# Patient Record
Sex: FEMALE | Race: WHITE | NOT HISPANIC OR LATINO | Employment: OTHER | URBAN - METROPOLITAN AREA
[De-identification: names, ages, dates, MRNs, and addresses within clinical notes are randomized per-mention and may not be internally consistent; named-entity substitution may affect disease eponyms.]

---

## 2017-10-23 RX ORDER — ATORVASTATIN CALCIUM 20 MG/1
TABLET, FILM COATED ORAL
Qty: 90 TABLET | Refills: 2 | Status: SHIPPED | OUTPATIENT
Start: 2017-10-23 | End: 2017-11-14 | Stop reason: HOSPADM

## 2017-11-12 ENCOUNTER — APPOINTMENT (OUTPATIENT)
Dept: NUCLEAR MEDICINE | Facility: HOSPITAL | Age: 72
End: 2017-11-12

## 2017-11-12 ENCOUNTER — APPOINTMENT (OUTPATIENT)
Dept: CT IMAGING | Facility: HOSPITAL | Age: 72
End: 2017-11-12

## 2017-11-12 ENCOUNTER — APPOINTMENT (OUTPATIENT)
Dept: GENERAL RADIOLOGY | Facility: HOSPITAL | Age: 72
End: 2017-11-12

## 2017-11-12 ENCOUNTER — HOSPITAL ENCOUNTER (OUTPATIENT)
Facility: HOSPITAL | Age: 72
Setting detail: OBSERVATION
LOS: 1 days | Discharge: HOME | End: 2017-11-14
Attending: EMERGENCY MEDICINE | Admitting: HOSPITALIST

## 2017-11-12 DIAGNOSIS — R55 SYNCOPE, UNSPECIFIED SYNCOPE TYPE: Primary | ICD-10-CM

## 2017-11-12 DIAGNOSIS — R00.1 BRADYCARDIA: ICD-10-CM

## 2017-11-12 DIAGNOSIS — R79.89 ELEVATED D-DIMER: ICD-10-CM

## 2017-11-12 PROBLEM — R93.89 MEDIASTINAL SHIFT: Status: ACTIVE | Noted: 2017-11-12

## 2017-11-12 PROBLEM — N18.4 CKD (CHRONIC KIDNEY DISEASE) STAGE 4, GFR 15-29 ML/MIN (HCC): Status: ACTIVE | Noted: 2017-11-12

## 2017-11-12 PROBLEM — D72.829 LEUKOCYTOSIS: Status: ACTIVE | Noted: 2017-11-12

## 2017-11-12 PROBLEM — I10 HTN (HYPERTENSION): Status: ACTIVE | Noted: 2017-11-12

## 2017-11-12 PROBLEM — Z91.199 MEDICALLY NONCOMPLIANT: Status: ACTIVE | Noted: 2017-11-12

## 2017-11-12 LAB
ABO GROUP BLD: NORMAL
ALBUMIN SERPL-MCNC: 3.8 G/DL (ref 3.5–5.2)
ALBUMIN/GLOB SERPL: 1.3 G/DL
ALP SERPL-CCNC: 64 U/L (ref 39–117)
ALT SERPL W P-5'-P-CCNC: 10 U/L (ref 1–33)
ANION GAP SERPL CALCULATED.3IONS-SCNC: 12.2 MMOL/L
APTT PPP: 22.9 SECONDS (ref 22.7–35.4)
AST SERPL-CCNC: 11 U/L (ref 1–32)
BASOPHILS # BLD AUTO: 0.03 10*3/MM3 (ref 0–0.2)
BASOPHILS NFR BLD AUTO: 0.3 % (ref 0–1.5)
BILIRUB SERPL-MCNC: 1.1 MG/DL (ref 0.1–1.2)
BLD GP AB SCN SERPL QL: NEGATIVE
BUN BLD-MCNC: 20 MG/DL (ref 8–23)
BUN/CREAT SERPL: 11.6 (ref 7–25)
CALCIUM SPEC-SCNC: 9.7 MG/DL (ref 8.6–10.5)
CHLORIDE SERPL-SCNC: 104 MMOL/L (ref 98–107)
CK SERPL-CCNC: 27 U/L (ref 20–180)
CO2 SERPL-SCNC: 24.8 MMOL/L (ref 22–29)
CREAT BLD-MCNC: 1.73 MG/DL (ref 0.57–1)
D DIMER PPP FEU-MCNC: 0.61 MCGFEU/ML (ref 0–0.49)
DEPRECATED RDW RBC AUTO: 46.8 FL (ref 37–54)
EOSINOPHIL # BLD AUTO: 0.06 10*3/MM3 (ref 0–0.7)
EOSINOPHIL NFR BLD AUTO: 0.5 % (ref 0.3–6.2)
ERYTHROCYTE [DISTWIDTH] IN BLOOD BY AUTOMATED COUNT: 14.4 % (ref 11.7–13)
GFR SERPL CREATININE-BSD FRML MDRD: 29 ML/MIN/1.73
GLOBULIN UR ELPH-MCNC: 3 GM/DL
GLUCOSE BLD-MCNC: 181 MG/DL (ref 65–99)
HCT VFR BLD AUTO: 44.4 % (ref 35.6–45.5)
HGB BLD-MCNC: 13.8 G/DL (ref 11.9–15.5)
IMM GRANULOCYTES # BLD: 0.03 10*3/MM3 (ref 0–0.03)
IMM GRANULOCYTES NFR BLD: 0.3 % (ref 0–0.5)
INR PPP: 1.05 (ref 0.9–1.1)
LYMPHOCYTES # BLD AUTO: 2.55 10*3/MM3 (ref 0.9–4.8)
LYMPHOCYTES NFR BLD AUTO: 22.4 % (ref 19.6–45.3)
MAGNESIUM SERPL-MCNC: 2.1 MG/DL (ref 1.6–2.4)
MCH RBC QN AUTO: 27.6 PG (ref 26.9–32)
MCHC RBC AUTO-ENTMCNC: 31.1 G/DL (ref 32.4–36.3)
MCV RBC AUTO: 88.8 FL (ref 80.5–98.2)
MONOCYTES # BLD AUTO: 0.74 10*3/MM3 (ref 0.2–1.2)
MONOCYTES NFR BLD AUTO: 6.5 % (ref 5–12)
NEUTROPHILS # BLD AUTO: 7.98 10*3/MM3 (ref 1.9–8.1)
NEUTROPHILS NFR BLD AUTO: 70 % (ref 42.7–76)
PLATELET # BLD AUTO: 219 10*3/MM3 (ref 140–500)
PMV BLD AUTO: 10 FL (ref 6–12)
POTASSIUM BLD-SCNC: 3.9 MMOL/L (ref 3.5–5.2)
PROT SERPL-MCNC: 6.8 G/DL (ref 6–8.5)
PROTHROMBIN TIME: 13.3 SECONDS (ref 11.7–14.2)
RBC # BLD AUTO: 5 10*6/MM3 (ref 3.9–5.2)
RH BLD: POSITIVE
SODIUM BLD-SCNC: 141 MMOL/L (ref 136–145)
TROPONIN T SERPL-MCNC: <0.01 NG/ML (ref 0–0.03)
WBC NRBC COR # BLD: 11.39 10*3/MM3 (ref 4.5–10.7)

## 2017-11-12 PROCEDURE — 96374 THER/PROPH/DIAG INJ IV PUSH: CPT

## 2017-11-12 PROCEDURE — 71250 CT THORAX DX C-: CPT

## 2017-11-12 PROCEDURE — 80053 COMPREHEN METABOLIC PANEL: CPT | Performed by: EMERGENCY MEDICINE

## 2017-11-12 PROCEDURE — 93005 ELECTROCARDIOGRAM TRACING: CPT | Performed by: INTERNAL MEDICINE

## 2017-11-12 PROCEDURE — 85025 COMPLETE CBC W/AUTO DIFF WBC: CPT | Performed by: EMERGENCY MEDICINE

## 2017-11-12 PROCEDURE — 78582 LUNG VENTILAT&PERFUS IMAGING: CPT

## 2017-11-12 PROCEDURE — 86850 RBC ANTIBODY SCREEN: CPT | Performed by: EMERGENCY MEDICINE

## 2017-11-12 PROCEDURE — A9540 TC99M MAA: HCPCS | Performed by: HOSPITALIST

## 2017-11-12 PROCEDURE — 85610 PROTHROMBIN TIME: CPT | Performed by: EMERGENCY MEDICINE

## 2017-11-12 PROCEDURE — 84484 ASSAY OF TROPONIN QUANT: CPT | Performed by: EMERGENCY MEDICINE

## 2017-11-12 PROCEDURE — 85730 THROMBOPLASTIN TIME PARTIAL: CPT | Performed by: EMERGENCY MEDICINE

## 2017-11-12 PROCEDURE — 82550 ASSAY OF CK (CPK): CPT | Performed by: EMERGENCY MEDICINE

## 2017-11-12 PROCEDURE — 93010 ELECTROCARDIOGRAM REPORT: CPT | Performed by: INTERNAL MEDICINE

## 2017-11-12 PROCEDURE — 0 XENON XE 133: Performed by: HOSPITALIST

## 2017-11-12 PROCEDURE — A9558 XE133 XENON 10MCI: HCPCS | Performed by: HOSPITALIST

## 2017-11-12 PROCEDURE — 86900 BLOOD TYPING SEROLOGIC ABO: CPT | Performed by: EMERGENCY MEDICINE

## 2017-11-12 PROCEDURE — 25010000002 ONDANSETRON PER 1 MG

## 2017-11-12 PROCEDURE — 85379 FIBRIN DEGRADATION QUANT: CPT | Performed by: EMERGENCY MEDICINE

## 2017-11-12 PROCEDURE — 70450 CT HEAD/BRAIN W/O DYE: CPT

## 2017-11-12 PROCEDURE — 96361 HYDRATE IV INFUSION ADD-ON: CPT

## 2017-11-12 PROCEDURE — 93005 ELECTROCARDIOGRAM TRACING: CPT | Performed by: EMERGENCY MEDICINE

## 2017-11-12 PROCEDURE — 71010 HC CHEST PA OR AP: CPT

## 2017-11-12 PROCEDURE — 86901 BLOOD TYPING SEROLOGIC RH(D): CPT | Performed by: EMERGENCY MEDICINE

## 2017-11-12 PROCEDURE — 99284 EMERGENCY DEPT VISIT MOD MDM: CPT

## 2017-11-12 PROCEDURE — 25010000002 ONDANSETRON PER 1 MG: Performed by: EMERGENCY MEDICINE

## 2017-11-12 PROCEDURE — 74176 CT ABD & PELVIS W/O CONTRAST: CPT

## 2017-11-12 PROCEDURE — 0 TECHNETIUM ALBUMIN AGGREGATED: Performed by: HOSPITALIST

## 2017-11-12 PROCEDURE — 83735 ASSAY OF MAGNESIUM: CPT | Performed by: EMERGENCY MEDICINE

## 2017-11-12 RX ORDER — ONDANSETRON 2 MG/ML
INJECTION INTRAMUSCULAR; INTRAVENOUS
Status: COMPLETED
Start: 2017-11-12 | End: 2017-11-12

## 2017-11-12 RX ORDER — SODIUM CHLORIDE 0.9 % (FLUSH) 0.9 %
1-10 SYRINGE (ML) INJECTION AS NEEDED
Status: DISCONTINUED | OUTPATIENT
Start: 2017-11-12 | End: 2017-11-14 | Stop reason: HOSPADM

## 2017-11-12 RX ORDER — PHENAZOPYRIDINE HYDROCHLORIDE 100 MG/1
100 TABLET, FILM COATED ORAL 3 TIMES DAILY PRN
COMMUNITY

## 2017-11-12 RX ORDER — ATORVASTATIN CALCIUM 20 MG/1
20 TABLET, FILM COATED ORAL DAILY
Status: DISCONTINUED | OUTPATIENT
Start: 2017-11-13 | End: 2017-11-14 | Stop reason: HOSPADM

## 2017-11-12 RX ORDER — ASPIRIN 325 MG
325 TABLET ORAL ONCE
Status: COMPLETED | OUTPATIENT
Start: 2017-11-12 | End: 2017-11-12

## 2017-11-12 RX ORDER — ACETAMINOPHEN 325 MG/1
650 TABLET ORAL EVERY 4 HOURS PRN
Status: DISCONTINUED | OUTPATIENT
Start: 2017-11-12 | End: 2017-11-14 | Stop reason: HOSPADM

## 2017-11-12 RX ORDER — NITROFURANTOIN 25 MG/5ML
SUSPENSION ORAL 4 TIMES DAILY
COMMUNITY

## 2017-11-12 RX ORDER — ONDANSETRON 2 MG/ML
4 INJECTION INTRAMUSCULAR; INTRAVENOUS EVERY 4 HOURS PRN
Status: DISCONTINUED | OUTPATIENT
Start: 2017-11-12 | End: 2017-11-14 | Stop reason: HOSPADM

## 2017-11-12 RX ORDER — ONDANSETRON 2 MG/ML
4 INJECTION INTRAMUSCULAR; INTRAVENOUS ONCE
Status: COMPLETED | OUTPATIENT
Start: 2017-11-12 | End: 2017-11-12

## 2017-11-12 RX ORDER — SODIUM CHLORIDE 9 MG/ML
75 INJECTION, SOLUTION INTRAVENOUS CONTINUOUS
Status: DISCONTINUED | OUTPATIENT
Start: 2017-11-12 | End: 2017-11-14

## 2017-11-12 RX ORDER — ISOSORBIDE MONONITRATE 30 MG/1
30 TABLET, EXTENDED RELEASE ORAL
Status: DISCONTINUED | OUTPATIENT
Start: 2017-11-13 | End: 2017-11-14 | Stop reason: HOSPADM

## 2017-11-12 RX ORDER — AMLODIPINE BESYLATE 5 MG/1
5 TABLET ORAL
Status: DISCONTINUED | OUTPATIENT
Start: 2017-11-13 | End: 2017-11-14 | Stop reason: HOSPADM

## 2017-11-12 RX ORDER — NITROGLYCERIN 0.4 MG/1
0.4 TABLET SUBLINGUAL
Status: DISCONTINUED | OUTPATIENT
Start: 2017-11-12 | End: 2017-11-14 | Stop reason: HOSPADM

## 2017-11-12 RX ORDER — SODIUM CHLORIDE 0.9 % (FLUSH) 0.9 %
10 SYRINGE (ML) INJECTION AS NEEDED
Status: DISCONTINUED | OUTPATIENT
Start: 2017-11-12 | End: 2017-11-14 | Stop reason: HOSPADM

## 2017-11-12 RX ADMIN — ASPIRIN 325 MG: 325 TABLET ORAL at 22:38

## 2017-11-12 RX ADMIN — SODIUM CHLORIDE 125 ML/HR: 9 INJECTION, SOLUTION INTRAVENOUS at 16:21

## 2017-11-12 RX ADMIN — ONDANSETRON 4 MG: 2 INJECTION INTRAMUSCULAR; INTRAVENOUS at 16:09

## 2017-11-12 RX ADMIN — XENON XE-133 5 MILLICURIE: 10 GAS RESPIRATORY (INHALATION) at 19:43

## 2017-11-12 RX ADMIN — Medication 1 DOSE: at 19:44

## 2017-11-12 NOTE — ED PROVIDER NOTES
EMERGENCY DEPARTMENT ENCOUNTER    CHIEF COMPLAINT  Chief Complaint: Syncope  History given by: Pt and family  History limited by: Nothing  Room Number: JEF/JEF  PMD: Alex Bonner MD  Cardiologist: Dr. Emmanuel    HPI:  Pt is a 72 y.o. female who presents with family s/p syncopal episode this afternoon. They report the pt was playing bingo this afternoon when she became pale with sensations of being hot. The family reports that they applied a cold rag to her head and neck which momentarily improved her sx. Shortly after, the pt had a witnessed syncopal episode that lasted 30 seconds.  During that time the pt became unresponsive with her eyes open. They state that the pt reported she could her them, but did not respond. The pt states she has not eaten today because she forgot to eat. She also c/o nausea, chills, generalized weakness, and anxiety. She denies CP, HA, blurred vision, SOA, vomiting, abd pain, dark stool, and fever. She states she a hx of anemia 40 years ago    Duration:  30 seconds  Onset: gradual  Timing: intermittent  Quality: unresponsive with eyes open  Intensity/Severity: moderate  Progression: improved  Associated Symptoms: generalized weakness, decreased appetite, nausea, chills, anxiety  Aggravating Factors: none  Alleviating Factors: none  Previous Episodes: Pt reports a hx of anemia 40 years ago  Treatment before arrival: None stated    PAST MEDICAL HISTORY  Active Ambulatory Problems     Diagnosis Date Noted   • Left ureteral stone 06/24/2016   • Right ureteral stone 06/24/2016   • Hydronephrosis with urinary obstruction due to ureteral calculus 06/24/2016   • ARF (acute renal failure) 06/24/2016   • Diarrhea 06/24/2016   • Hyponatremia 06/24/2016   • Dehydration 06/24/2016   • Thrombocytopenia 06/25/2016   • Pyelonephritis 06/28/2016   • CKD (chronic kidney disease) 07/04/2016   • Chest pain 07/04/2016   • NSTEMI (non-ST elevated myocardial infarction) 07/06/2016   • Sepsis 07/06/2016   •  Neutrophilic leukocytosis 08/05/2016     Resolved Ambulatory Problems     Diagnosis Date Noted   • No Resolved Ambulatory Problems     Past Medical History:   Diagnosis Date   • Anemia    • Asthma    • Cellulitis    • Chest pain    • Edema    • Gastroenteritis    • Hypertension    • Kidney stones    • Pyelonephritis        PAST SURGICAL HISTORY  Past Surgical History:   Procedure Laterality Date   • CARDIAC CATHETERIZATION N/A 7/5/2016    Procedure: Left Heart Cath; no LV gram;  Surgeon: Jem Barrow MD;  Location: [facility] CATH INVASIVE LOCATION;  Service:    • CYSTOSCOPY W/ LITHOLAPAXY / EHL     • CYSTOSCOPY W/ URETERAL STENT PLACEMENT Bilateral 6/24/2016    Procedure: CYSTOSCOPY URETERAL CATHETER/STENT INSERTION;  Surgeon: Gaurav Mendoza Jr., MD;  Location: [facility] OR;  Service:    • DIAPHRAGMATIC HERNIA REPAIR     • HYSTERECTOMY         FAMILY HISTORY  Family History   Problem Relation Age of Onset   • Adopted: Yes       SOCIAL HISTORY  Social History     Social History   • Marital status: Single     Spouse name: N/A   • Number of children: N/A   • Years of education: N/A     Occupational History   • Not on file.     Social History Main Topics   • Smoking status: Former Smoker   • Smokeless tobacco: Never Used      Comment: quit at least 5 years ago   • Alcohol use No   • Drug use: No   • Sexual activity: Not on file     Other Topics Concern   • Not on file     Social History Narrative   • No narrative on file       ALLERGIES  Codeine; Motrin [ibuprofen]; Naproxen; Penicillins; and Sulfa antibiotics    REVIEW OF SYSTEMS  Review of Systems   Constitutional: Positive for appetite change (decreased) and chills. Negative for fatigue and fever.   HENT: Negative for sore throat.    Eyes: Negative.  Negative for visual disturbance.   Respiratory: Negative for cough and shortness of breath.    Cardiovascular: Negative for chest pain.   Gastrointestinal: Positive for nausea. Negative for abdominal pain, blood  in stool, diarrhea and vomiting.   Genitourinary: Negative for dysuria.   Musculoskeletal: Negative for neck pain.   Skin: Negative for rash.   Allergic/Immunologic: Negative.    Neurological: Positive for syncope (30 seconds) and weakness (generalized weakness). Negative for numbness and headaches.   Hematological: Negative.    Psychiatric/Behavioral: The patient is nervous/anxious.    All other systems reviewed and are negative.      PHYSICAL EXAM  ED Triage Vitals   Temp Heart Rate Resp BP SpO2   -- 11/12/17 1552 11/12/17 1552 -- 11/12/17 1552    47 18  95 %      Temp src Heart Rate Source Patient Position BP Location FiO2 (%)   -- 11/12/17 1552 -- -- --    Monitor          Physical Exam   Constitutional: She is oriented to person, place, and time and well-developed, well-nourished, and in no distress. No distress.   Pt is dry heaving on exam   HENT:   Head: Normocephalic and atraumatic.   Mouth/Throat: Oropharynx is clear and moist.   Eyes: EOM are normal. Pupils are equal, round, and reactive to light.   Neck: Normal range of motion. Neck supple. No JVD present. Carotid bruit is not present.   Cardiovascular: Regular rhythm, normal heart sounds and intact distal pulses.  Bradycardia present.  Exam reveals no gallop.    No murmur heard.  Pulses:       Dorsalis pedis pulses are 2+ on the right side, and 2+ on the left side.   Pulmonary/Chest: Effort normal and breath sounds normal. No respiratory distress. She has no wheezes. She has no rales.   Abdominal: Soft. Bowel sounds are normal. There is no tenderness. There is no rebound and no guarding.   Genitourinary: Rectal exam shows guaiac negative stool.   Genitourinary Comments: Brown stool in rectal vault   Musculoskeletal: Normal range of motion. She exhibits no edema.   Neurological: She is alert and oriented to person, place, and time. She has normal sensation and normal strength.   Pt displays generalized weakness without focal neuro deficits.    Skin: Skin  is warm and dry. No rash noted. There is pallor.   Skin is cool and clammy   Psychiatric: Mood and affect normal.   Nursing note and vitals reviewed.      LAB RESULTS  Lab Results (last 24 hours)     Procedure Component Value Units Date/Time    CBC & Differential [[id number]] Collected:  11/12/17 1615    Specimen:  Blood Updated:  11/12/17 1629    Narrative:       The following orders were created for panel order CBC & Differential.  Procedure                               Abnormality         Status                     ---------                               -----------         ------                     CBC Auto Differential[[id number]]        Abnormal            Final result                 Please view results for these tests on the individual orders.    Comprehensive Metabolic Panel [[id number]]  (Abnormal) Collected:  11/12/17 1615    Specimen:  Blood Updated:  11/12/17 1654     Glucose 181 (H) mg/dL      BUN 20 mg/dL      Creatinine 1.73 (H) mg/dL      Sodium 141 mmol/L      Potassium 3.9 mmol/L      Chloride 104 mmol/L      CO2 24.8 mmol/L      Calcium 9.7 mg/dL      Total Protein 6.8 g/dL      Albumin 3.80 g/dL      ALT (SGPT) 10 U/L      AST (SGOT) 11 U/L      Alkaline Phosphatase 64 U/L      Total Bilirubin 1.1 mg/dL      eGFR Non African Amer 29 (L) mL/min/1.73      Globulin 3.0 gm/dL      A/G Ratio 1.3 g/dL      BUN/Creatinine Ratio 11.6     Anion Gap 12.2 mmol/L     Narrative:       The MDRD GFR formula is only valid for adults with stable renal function between ages 18 and 70.    Protime-INR [[id number]]  (Normal) Collected:  11/12/17 1615    Specimen:  Blood Updated:  11/12/17 1642     Protime 13.3 Seconds      INR 1.05    aPTT [[id number]]  (Normal) Collected:  11/12/17 1615    Specimen:  Blood Updated:  11/12/17 1642     PTT 22.9 seconds     Troponin [[id number]]  (Normal) Collected:  11/12/17 1615    Specimen:  Blood Updated:  11/12/17 1653     Troponin T <0.010 ng/mL     Narrative:       Troponin T  Reference Ranges:  Less than 0.03 ng/mL:    Negative for AMI  0.03 to 0.09 ng/mL:      Indeterminant for AMI  Greater than 0.09 ng/mL: Positive for AMI    CK [[id number]]  (Normal) Collected:  11/12/17 1615    Specimen:  Blood Updated:  11/12/17 1653     Creatine Kinase 27 U/L     Magnesium [[id number]]  (Normal) Collected:  11/12/17 1615    Specimen:  Blood Updated:  11/12/17 1653     Magnesium 2.1 mg/dL     D-dimer, Quantitative [[id number]]  (Abnormal) Collected:  11/12/17 1615    Specimen:  Blood Updated:  11/12/17 1642     D-Dimer, Quantitative 0.61 (H) MCGFEU/mL     Narrative:       The Stago D-Dimer test used in conjunction with a clinical pretest probability (PTP) assessment model, has been approved by the FDA to rule out the presence of venous thromboembolism (VTE) in outpatients suspected of deep venous thrombosis (DVT) or pulmonary embolism (PE).     CBC Auto Differential [[id number]]  (Abnormal) Collected:  11/12/17 1615    Specimen:  Blood Updated:  11/12/17 1629     WBC 11.39 (H) 10*3/mm3      RBC 5.00 10*6/mm3      Hemoglobin 13.8 g/dL      Hematocrit 44.4 %      MCV 88.8 fL      MCH 27.6 pg      MCHC 31.1 (L) g/dL      RDW 14.4 (H) %      RDW-SD 46.8 fl      MPV 10.0 fL      Platelets 219 10*3/mm3      Neutrophil % 70.0 %      Lymphocyte % 22.4 %      Monocyte % 6.5 %      Eosinophil % 0.5 %      Basophil % 0.3 %      Immature Grans % 0.3 %      Neutrophils, Absolute 7.98 10*3/mm3      Lymphocytes, Absolute 2.55 10*3/mm3      Monocytes, Absolute 0.74 10*3/mm3      Eosinophils, Absolute 0.06 10*3/mm3      Basophils, Absolute 0.03 10*3/mm3      Immature Grans, Absolute 0.03 10*3/mm3           I ordered the above labs and reviewed the results    RADIOLOGY  CT Abdomen Pelvis Without Contrast   Final Result     1. Prominent elevation left hemidiaphragm including stomach and colon.  Visualized portion of the aerated right lung reveal minimal atelectasis  without acute disease.  2. Gallstones without other  biliary nor hepatic abnormality, normal  spleen and adjacent splenule.  3. Benign 2.5 cm left adrenal nodule, marked atrophy of the left kidney  with coarse calcifications including calcification within the renal  pelvis. Coarse calcifications within the upper pole moiety of a  duplicated right kidney which demonstrates mild cortical thinning but no  obstruction.  4. There is no obstruction, free air nor dilatation of bowel.  5. The uterus is surgically absent. There is no adnexal mass nor free  fluid  6. Degenerative changes lumbar spine with chronic compression of L1 L2  L3 and probably L4, relative canal stenosis L2-L3 with prominent  posterior spur formation. Canal stenosis L5-S1 with prominent osteophyte  extending into the bony canal from the left facet joint most pronounced  on axial image #89.     CT Head Without Contrast   Final Result   1. No evidence of acute intracranial process.       This report was finalized on 11/12/2017 5:11 PM by Dr. Onesimo Hudson MD.          XR Chest 1 View   Final Result     1. Limited imaging due to elevation left hemidiaphragm similar to  previous study obscuring the inferior two thirds of the left lung.  2. Mild mediastinal shift to the right.  3. Aerated portion of right lung appears grossly normal.  4. If further evaluation desired lateral view or perhaps CT would be  helpful.     NM Lung Ventilation Perfusion    (Results Pending)    Low probability for PE.     I ordered the above noted radiological studies. Interpreted by radiologist. Reviewed by me in PACS.       PROCEDURES  Critical Care  Performed by: RAMIRO OCHOA  Authorized by: RAMIRO OCHOA   Total critical care time: 40 minutes  Critical care time was exclusive of separately billable procedures and treating other patients.  Critical care was necessary to treat or prevent imminent or life-threatening deterioration of the following conditions: cardiac failure and CNS failure or compromise.  Critical care was time  spent personally by me on the following activities: development of treatment plan with patient or surrogate, discussions with primary provider, interpretation of cardiac output measurements, evaluation of patient's response to treatment, examination of patient, obtaining history from patient or surrogate, ordering and performing treatments and interventions, ordering and review of laboratory studies, re-evaluation of patient's condition, pulse oximetry, ordering and review of radiographic studies and review of old charts.          EKG           EKG time: 1555  Rhythm/Rate: sinus bradycardia, 48  P waves and NJ: normal  QRS, axis: normal   ST and T waves: nonspecific ST changes     Interpreted Contemporaneously by me, independently viewed  changed compared to prior 7/6/16       PROGRESS AND CONSULTS  ED Course     1601 - Zofran ordered for nausea.     1607 - Informd pt and daughter of the pt's EKG which shows sinus bradycardia, but looks improved compared to her prior EKG.     1613 - IVF ordered. Lab work ordered for further evaluation.     1615 - CT head, abd/pelvis, and CXR ordered for further evaluation.     1739 - VQ scan ordered.    1740 - Rechecked pt. Pt is resting comfortably and appears more responsive. Vitals are stable, and BP has improved. Pt is still bradycardic. Informed pt that she is not anemic, but has an elevated D-dimer and is in chronic renal failure. Informed pt of the imaging study which shows a diphermetic hernia on the L. D/w pt and family of the plan to perform a VQ scan. Informed pt and family of the plan for admission. All questions answered.       1744 - Consult placed with [facility].     1801 - Consulted with Dr. Gray ([facility]) who agrees to admit the pt telemetry.       MEDICAL DECISION MAKING  Results were reviewed/discussed with the patient and they were also made aware of online access. Pt also made aware that some labs, such as cultures, will not be resulted during ER visit and follow up  with PMD is necessary.     MDM  Number of Diagnoses or Management Options  Bradycardia:   Elevated d-dimer:   Syncope, unspecified syncope type:      Amount and/or Complexity of Data Reviewed  Clinical lab tests: ordered and reviewed (Creatinine - 1.73  Troponin - negative  D-dimer - 0.61  WBC - 11.39)  Tests in the radiology section of CPT®: ordered (CT head - negative acute  CXR - mild mediastinal shift to the right  CT abd/pelvis - Prominent elevation left hemidiaphragm including stomach and colon.)  Tests in the medicine section of CPT®: ordered and reviewed (See note.)  Decide to obtain previous medical records or to obtain history from someone other than the patient: yes  Review and summarize past medical records: yes (Pt was admitted June 2016 for a kidney stone and had a non-STEMI while she was an inpt. She was cathed and adivsed to be treated medically.)  Discuss the patient with other providers: yes (Dr. Gray ([facility]))    Critical Care  Total time providing critical care: 30-74 minutes         DIAGNOSIS  Final diagnoses:   Syncope, unspecified syncope type   Bradycardia   Elevated d-dimer       DISPOSITION  ADMISSION    Discussed treatment plan and reason for admission with pt/family and admitting physician.  Pt/family voiced understanding of the plan for admission for further testing/treatment as needed.         Latest Documented Vital Signs:  As of 7:31 PM  BP- 107/72 HR- (!) 49 Temp- 96.5 °F (35.8 °C) (Tympanic) O2 sat- 93%    --  Documentation assistance provided by joyce Ruiz for Dr. Doherty.  Information recorded by the scribe was done at my direction and has been verified and validated by me.       Collin Ruiz  11/12/17 1938       Juan Doherty MD  11/12/17 1959

## 2017-11-12 NOTE — ED NOTES
Patient son reports his mother was at [facility] when she began to get dizzy, nauseas and started to look pale. Patients son reports his mom has been lethargic and at one point she became unresponsive for about 30 seconds. Pt sons states that his mother did not fall     Marilyn Munguia RN  11/12/17 3072       Marilyn Munguia RN  11/12/17 4386

## 2017-11-13 ENCOUNTER — APPOINTMENT (OUTPATIENT)
Dept: CARDIOLOGY | Facility: HOSPITAL | Age: 72
End: 2017-11-13
Attending: HOSPITALIST

## 2017-11-13 ENCOUNTER — APPOINTMENT (OUTPATIENT)
Dept: MRI IMAGING | Facility: HOSPITAL | Age: 72
End: 2017-11-13
Attending: HOSPITALIST

## 2017-11-13 PROBLEM — R73.03 PREDIABETES: Status: ACTIVE | Noted: 2017-11-13

## 2017-11-13 PROBLEM — R82.90 ABNORMAL URINALYSIS: Status: ACTIVE | Noted: 2017-11-13

## 2017-11-13 LAB
ALBUMIN SERPL-MCNC: 3.5 G/DL (ref 3.5–5.2)
AMORPH URATE CRY URNS QL MICRO: ABNORMAL /HPF
ANION GAP SERPL CALCULATED.3IONS-SCNC: 15.5 MMOL/L
AORTIC DIMENSIONLESS INDEX: 0.7 (DI)
BACTERIA UR QL AUTO: ABNORMAL /HPF
BH CV ECHO MEAS - AO MAX PG: 9 MMHG
BH CV ECHO MEAS - AO MEAN PG (FULL): 3 MMHG
BH CV ECHO MEAS - AO MEAN PG: 5 MMHG
BH CV ECHO MEAS - AO ROOT AREA (BSA CORRECTED): 1.4
BH CV ECHO MEAS - AO ROOT AREA: 6.2 CM^2
BH CV ECHO MEAS - AO ROOT DIAM: 2.8 CM
BH CV ECHO MEAS - AO V2 MAX: 153 CM/SEC
BH CV ECHO MEAS - AO V2 MEAN: 100 CM/SEC
BH CV ECHO MEAS - AO V2 VTI: 33.7 CM
BH CV ECHO MEAS - ASC AORTA: 2.8 CM
BH CV ECHO MEAS - AVA(I,A): 2.2 CM^2
BH CV ECHO MEAS - AVA(I,D): 2.2 CM^2
BH CV ECHO MEAS - BSA(HAYCOCK): 2.1 M^2
BH CV ECHO MEAS - BSA: 2 M^2
BH CV ECHO MEAS - BZI_BMI: 32.3 KILOGRAMS/M^2
BH CV ECHO MEAS - BZI_METRIC_HEIGHT: 167.6 CM
BH CV ECHO MEAS - BZI_METRIC_WEIGHT: 90.7 KG
BH CV ECHO MEAS - CONTRAST EF (2CH): 59.6 ML/M^2
BH CV ECHO MEAS - CONTRAST EF 4CH: 50.5 ML/M^2
BH CV ECHO MEAS - EDV(MOD-SP2): 141 ML
BH CV ECHO MEAS - EDV(MOD-SP4): 93 ML
BH CV ECHO MEAS - EF(MOD-SP2): 59.6 %
BH CV ECHO MEAS - EF(MOD-SP4): 50.5 %
BH CV ECHO MEAS - ESV(MOD-SP2): 57 ML
BH CV ECHO MEAS - ESV(MOD-SP4): 46 ML
BH CV ECHO MEAS - LAT PEAK E' VEL: 7 CM/SEC
BH CV ECHO MEAS - LV DIASTOLIC VOL/BSA (35-75): 46.5 ML/M^2
BH CV ECHO MEAS - LV MAX PG: 4 MMHG
BH CV ECHO MEAS - LV MEAN PG: 2 MMHG
BH CV ECHO MEAS - LV SYSTOLIC VOL/BSA (12-30): 23 ML/M^2
BH CV ECHO MEAS - LV V1 MAX: 103 CM/SEC
BH CV ECHO MEAS - LV V1 MEAN: 63.6 CM/SEC
BH CV ECHO MEAS - LV V1 VTI: 24.1 CM
BH CV ECHO MEAS - LVLD AP2: 8.1 CM
BH CV ECHO MEAS - LVLD AP4: 7.4 CM
BH CV ECHO MEAS - LVLS AP2: 7.3 CM
BH CV ECHO MEAS - LVLS AP4: 5.7 CM
BH CV ECHO MEAS - LVOT AREA (M): 3.1 CM^2
BH CV ECHO MEAS - LVOT AREA: 3.1 CM^2
BH CV ECHO MEAS - LVOT DIAM: 2 CM
BH CV ECHO MEAS - MED PEAK E' VEL: 6 CM/SEC
BH CV ECHO MEAS - MV A DUR: 0.12 SEC
BH CV ECHO MEAS - MV A MAX VEL: 82.4 CM/SEC
BH CV ECHO MEAS - MV DEC SLOPE: 311 CM/SEC^2
BH CV ECHO MEAS - MV DEC TIME: 0.2 SEC
BH CV ECHO MEAS - MV E MAX VEL: 84.9 CM/SEC
BH CV ECHO MEAS - MV E/A: 1
BH CV ECHO MEAS - MV MEAN PG: 2 MMHG
BH CV ECHO MEAS - MV P1/2T MAX VEL: 102 CM/SEC
BH CV ECHO MEAS - MV P1/2T: 96.1 MSEC
BH CV ECHO MEAS - MV V2 MEAN: 60.8 CM/SEC
BH CV ECHO MEAS - MV V2 VTI: 34.6 CM
BH CV ECHO MEAS - MVA P1/2T LCG: 2.2 CM^2
BH CV ECHO MEAS - MVA(P1/2T): 2.3 CM^2
BH CV ECHO MEAS - MVA(VTI): 2.2 CM^2
BH CV ECHO MEAS - PA ACC SLOPE: 7.1 CM/SEC^2
BH CV ECHO MEAS - PA ACC TIME: 0.12 SEC
BH CV ECHO MEAS - PA MAX PG: 2.4 MMHG
BH CV ECHO MEAS - PA PR(ACCEL): 23.7 MMHG
BH CV ECHO MEAS - PA V2 MAX: 77.3 CM/SEC
BH CV ECHO MEAS - PULM A REVS DUR: 0.18 SEC
BH CV ECHO MEAS - PULM A REVS VEL: 35.9 CM/SEC
BH CV ECHO MEAS - PULM DIAS VEL: 39 CM/SEC
BH CV ECHO MEAS - PULM S/D: 1.2
BH CV ECHO MEAS - PULM SYS VEL: 46.3 CM/SEC
BH CV ECHO MEAS - QP/QS: 0.27
BH CV ECHO MEAS - RAP SYSTOLE: 3 MMHG
BH CV ECHO MEAS - RV MEAN PG: 1 MMHG
BH CV ECHO MEAS - RV V1 MEAN: 35.4 CM/SEC
BH CV ECHO MEAS - RV V1 VTI: 10.3 CM
BH CV ECHO MEAS - RVOT AREA: 2 CM^2
BH CV ECHO MEAS - RVOT DIAM: 1.6 CM
BH CV ECHO MEAS - SI(AO): 103.7 ML/M^2
BH CV ECHO MEAS - SI(LVOT): 37.9 ML/M^2
BH CV ECHO MEAS - SI(MOD-SP2): 42 ML/M^2
BH CV ECHO MEAS - SI(MOD-SP4): 23.5 ML/M^2
BH CV ECHO MEAS - SV(AO): 207.5 ML
BH CV ECHO MEAS - SV(LVOT): 75.7 ML
BH CV ECHO MEAS - SV(MOD-SP2): 84 ML
BH CV ECHO MEAS - SV(MOD-SP4): 47 ML
BH CV ECHO MEAS - SV(RVOT): 20.7 ML
BH CV ECHO MEAS - TAPSE (>1.6): 2.2 CM2
BH CV LOWER VASCULAR LEFT COMMON FEMORAL AUGMENT: NORMAL
BH CV LOWER VASCULAR LEFT COMMON FEMORAL COMPETENT: NORMAL
BH CV LOWER VASCULAR LEFT COMMON FEMORAL COMPRESS: NORMAL
BH CV LOWER VASCULAR LEFT COMMON FEMORAL PHASIC: NORMAL
BH CV LOWER VASCULAR LEFT COMMON FEMORAL SPONT: NORMAL
BH CV LOWER VASCULAR LEFT DISTAL FEMORAL COMPRESS: NORMAL
BH CV LOWER VASCULAR LEFT GASTRONEMIUS COMPRESS: NORMAL
BH CV LOWER VASCULAR LEFT GREATER SAPH AK COMPRESS: NORMAL
BH CV LOWER VASCULAR LEFT GREATER SAPH BK COMPRESS: NORMAL
BH CV LOWER VASCULAR LEFT MID FEMORAL AUGMENT: NORMAL
BH CV LOWER VASCULAR LEFT MID FEMORAL COMPETENT: NORMAL
BH CV LOWER VASCULAR LEFT MID FEMORAL COMPRESS: NORMAL
BH CV LOWER VASCULAR LEFT MID FEMORAL PHASIC: NORMAL
BH CV LOWER VASCULAR LEFT MID FEMORAL SPONT: NORMAL
BH CV LOWER VASCULAR LEFT PERONEAL COMPRESS: NORMAL
BH CV LOWER VASCULAR LEFT POPLITEAL AUGMENT: NORMAL
BH CV LOWER VASCULAR LEFT POPLITEAL COMPETENT: NORMAL
BH CV LOWER VASCULAR LEFT POPLITEAL COMPRESS: NORMAL
BH CV LOWER VASCULAR LEFT POPLITEAL PHASIC: NORMAL
BH CV LOWER VASCULAR LEFT POPLITEAL SPONT: NORMAL
BH CV LOWER VASCULAR LEFT POSTERIOR TIBIAL COMPRESS: NORMAL
BH CV LOWER VASCULAR LEFT PROXIMAL FEMORAL COMPRESS: NORMAL
BH CV LOWER VASCULAR LEFT SAPHENOFEMORAL JUNCTION AUGMENT: NORMAL
BH CV LOWER VASCULAR LEFT SAPHENOFEMORAL JUNCTION COMPETENT: NORMAL
BH CV LOWER VASCULAR LEFT SAPHENOFEMORAL JUNCTION COMPRESS: NORMAL
BH CV LOWER VASCULAR LEFT SAPHENOFEMORAL JUNCTION PHASIC: NORMAL
BH CV LOWER VASCULAR LEFT SAPHENOFEMORAL JUNCTION SPONT: NORMAL
BH CV LOWER VASCULAR RIGHT COMMON FEMORAL AUGMENT: NORMAL
BH CV LOWER VASCULAR RIGHT COMMON FEMORAL COMPETENT: NORMAL
BH CV LOWER VASCULAR RIGHT COMMON FEMORAL COMPRESS: NORMAL
BH CV LOWER VASCULAR RIGHT COMMON FEMORAL PHASIC: NORMAL
BH CV LOWER VASCULAR RIGHT COMMON FEMORAL SPONT: NORMAL
BH CV LOWER VASCULAR RIGHT DISTAL FEMORAL COMPRESS: NORMAL
BH CV LOWER VASCULAR RIGHT GASTRONEMIUS COMPRESS: NORMAL
BH CV LOWER VASCULAR RIGHT GREATER SAPH AK COMPRESS: NORMAL
BH CV LOWER VASCULAR RIGHT GREATER SAPH BK COMPRESS: NORMAL
BH CV LOWER VASCULAR RIGHT MID FEMORAL AUGMENT: NORMAL
BH CV LOWER VASCULAR RIGHT MID FEMORAL COMPETENT: NORMAL
BH CV LOWER VASCULAR RIGHT MID FEMORAL COMPRESS: NORMAL
BH CV LOWER VASCULAR RIGHT MID FEMORAL PHASIC: NORMAL
BH CV LOWER VASCULAR RIGHT MID FEMORAL SPONT: NORMAL
BH CV LOWER VASCULAR RIGHT PERONEAL COMPRESS: NORMAL
BH CV LOWER VASCULAR RIGHT POPLITEAL AUGMENT: NORMAL
BH CV LOWER VASCULAR RIGHT POPLITEAL COMPETENT: NORMAL
BH CV LOWER VASCULAR RIGHT POPLITEAL COMPRESS: NORMAL
BH CV LOWER VASCULAR RIGHT POPLITEAL PHASIC: NORMAL
BH CV LOWER VASCULAR RIGHT POPLITEAL SPONT: NORMAL
BH CV LOWER VASCULAR RIGHT POSTERIOR TIBIAL COMPRESS: NORMAL
BH CV LOWER VASCULAR RIGHT PROXIMAL FEMORAL COMPRESS: NORMAL
BH CV LOWER VASCULAR RIGHT SAPHENOFEMORAL JUNCTION AUGMENT: NORMAL
BH CV LOWER VASCULAR RIGHT SAPHENOFEMORAL JUNCTION COMPETENT: NORMAL
BH CV LOWER VASCULAR RIGHT SAPHENOFEMORAL JUNCTION COMPRESS: NORMAL
BH CV LOWER VASCULAR RIGHT SAPHENOFEMORAL JUNCTION PHASIC: NORMAL
BH CV LOWER VASCULAR RIGHT SAPHENOFEMORAL JUNCTION SPONT: NORMAL
BH CV VAS BP RIGHT ARM: NORMAL MMHG
BH CV XLRA - RV BASE: 3 CM
BH CV XLRA - TDI S': 13 CM/SEC
BH CV XLRA MEAS LEFT CCA RATIO VEL: 82.7 CM/SEC
BH CV XLRA MEAS LEFT DIST CCA EDV: 17 CM/SEC
BH CV XLRA MEAS LEFT DIST CCA PSV: 82.7 CM/SEC
BH CV XLRA MEAS LEFT DIST ICA EDV: -15.3 CM/SEC
BH CV XLRA MEAS LEFT DIST ICA PSV: -54.6 CM/SEC
BH CV XLRA MEAS LEFT ICA RATIO VEL: -97.4 CM/SEC
BH CV XLRA MEAS LEFT ICA/CCA RATIO: -1.2
BH CV XLRA MEAS LEFT MID ICA EDV: -18.5 CM/SEC
BH CV XLRA MEAS LEFT MID ICA PSV: -54.2 CM/SEC
BH CV XLRA MEAS LEFT PROX CCA EDV: 14.1 CM/SEC
BH CV XLRA MEAS LEFT PROX CCA PSV: 65.7 CM/SEC
BH CV XLRA MEAS LEFT PROX ECA EDV: -9.8 CM/SEC
BH CV XLRA MEAS LEFT PROX ECA PSV: -175 CM/SEC
BH CV XLRA MEAS LEFT PROX ICA EDV: -18.9 CM/SEC
BH CV XLRA MEAS LEFT PROX ICA PSV: -97.4 CM/SEC
BH CV XLRA MEAS LEFT PROX SCLA PSV: 105 CM/SEC
BH CV XLRA MEAS LEFT VERTEBRAL A EDV: 18.1 CM/SEC
BH CV XLRA MEAS LEFT VERTEBRAL A PSV: 55.4 CM/SEC
BH CV XLRA MEAS RIGHT CCA RATIO VEL: -90.9 CM/SEC
BH CV XLRA MEAS RIGHT DIST CCA EDV: -18.2 CM/SEC
BH CV XLRA MEAS RIGHT DIST CCA PSV: -90.9 CM/SEC
BH CV XLRA MEAS RIGHT DIST ICA EDV: -15.6 CM/SEC
BH CV XLRA MEAS RIGHT DIST ICA PSV: -51.1 CM/SEC
BH CV XLRA MEAS RIGHT ICA RATIO VEL: -77.8 CM/SEC
BH CV XLRA MEAS RIGHT ICA/CCA RATIO: 0.86
BH CV XLRA MEAS RIGHT MID ICA EDV: -14.9 CM/SEC
BH CV XLRA MEAS RIGHT MID ICA PSV: -77.8 CM/SEC
BH CV XLRA MEAS RIGHT PROX CCA EDV: 13.5 CM/SEC
BH CV XLRA MEAS RIGHT PROX CCA PSV: 68.6 CM/SEC
BH CV XLRA MEAS RIGHT PROX ECA EDV: -12.6 CM/SEC
BH CV XLRA MEAS RIGHT PROX ECA PSV: -119 CM/SEC
BH CV XLRA MEAS RIGHT PROX ICA EDV: 21.2 CM/SEC
BH CV XLRA MEAS RIGHT PROX ICA PSV: 74.3 CM/SEC
BH CV XLRA MEAS RIGHT PROX SCLA EDV: 8.6 CM/SEC
BH CV XLRA MEAS RIGHT PROX SCLA PSV: 85.6 CM/SEC
BH CV XLRA MEAS RIGHT VERTEBRAL A EDV: -3.3 CM/SEC
BH CV XLRA MEAS RIGHT VERTEBRAL A PSV: -20.2 CM/SEC
BILIRUB UR QL STRIP: NEGATIVE
BUN BLD-MCNC: 19 MG/DL (ref 8–23)
BUN/CREAT SERPL: 13.1 (ref 7–25)
CALCIUM SPEC-SCNC: 9.4 MG/DL (ref 8.6–10.5)
CHLORIDE SERPL-SCNC: 105 MMOL/L (ref 98–107)
CHOLEST SERPL-MCNC: 196 MG/DL (ref 0–200)
CLARITY UR: ABNORMAL
CO2 SERPL-SCNC: 18.5 MMOL/L (ref 22–29)
COLOR UR: ABNORMAL
CREAT BLD-MCNC: 1.45 MG/DL (ref 0.57–1)
DEPRECATED RDW RBC AUTO: 48.2 FL (ref 37–54)
E/E' RATIO: 13.5
ERYTHROCYTE [DISTWIDTH] IN BLOOD BY AUTOMATED COUNT: 14.7 % (ref 11.7–13)
GFR SERPL CREATININE-BSD FRML MDRD: 36 ML/MIN/1.73
GLUCOSE BLD-MCNC: 137 MG/DL (ref 65–99)
GLUCOSE UR STRIP-MCNC: NEGATIVE MG/DL
HBA1C MFR BLD: 5.99 % (ref 4.8–5.6)
HCT VFR BLD AUTO: 42.5 % (ref 35.6–45.5)
HDLC SERPL-MCNC: 58 MG/DL (ref 40–60)
HGB BLD-MCNC: 13.1 G/DL (ref 11.9–15.5)
HGB UR QL STRIP.AUTO: ABNORMAL
HYALINE CASTS UR QL AUTO: ABNORMAL /LPF
KETONES UR QL STRIP: ABNORMAL
LDLC SERPL CALC-MCNC: 113 MG/DL (ref 0–100)
LDLC/HDLC SERPL: 1.96 {RATIO}
LEFT ARM BP: NORMAL MMHG
LEFT ATRIUM VOLUME INDEX: 18 ML/M2
LEUKOCYTE ESTERASE UR QL STRIP.AUTO: ABNORMAL
MAGNESIUM SERPL-MCNC: 2.1 MG/DL (ref 1.6–2.4)
MAXIMAL PREDICTED HEART RATE: 149 BPM
MCH RBC QN AUTO: 27.6 PG (ref 26.9–32)
MCHC RBC AUTO-ENTMCNC: 30.8 G/DL (ref 32.4–36.3)
MCV RBC AUTO: 89.7 FL (ref 80.5–98.2)
NITRITE UR QL STRIP: POSITIVE
PH UR STRIP.AUTO: <=5 [PH] (ref 5–8)
PHOSPHATE SERPL-MCNC: 2.6 MG/DL (ref 2.5–4.5)
PLATELET # BLD AUTO: 197 10*3/MM3 (ref 140–500)
PMV BLD AUTO: 9.9 FL (ref 6–12)
POTASSIUM BLD-SCNC: 4.5 MMOL/L (ref 3.5–5.2)
PROCALCITONIN SERPL-MCNC: 0.05 NG/ML (ref 0.1–0.25)
PROT UR QL STRIP: NEGATIVE
RBC # BLD AUTO: 4.74 10*6/MM3 (ref 3.9–5.2)
RBC # UR: ABNORMAL /HPF
REF LAB TEST METHOD: ABNORMAL
RIGHT ARM BP: NORMAL MMHG
SODIUM BLD-SCNC: 139 MMOL/L (ref 136–145)
SP GR UR STRIP: 1.01 (ref 1–1.03)
SQUAMOUS #/AREA URNS HPF: ABNORMAL /HPF
STRESS TARGET HR: 127 BPM
TRIGL SERPL-MCNC: 123 MG/DL (ref 0–150)
UROBILINOGEN UR QL STRIP: ABNORMAL
VLDLC SERPL-MCNC: 24.6 MG/DL (ref 5–40)
WBC NRBC COR # BLD: 11.52 10*3/MM3 (ref 4.5–10.7)
WBC UR QL AUTO: ABNORMAL /HPF

## 2017-11-13 PROCEDURE — 83735 ASSAY OF MAGNESIUM: CPT | Performed by: HOSPITALIST

## 2017-11-13 PROCEDURE — 93005 ELECTROCARDIOGRAM TRACING: CPT | Performed by: INTERNAL MEDICINE

## 2017-11-13 PROCEDURE — G0378 HOSPITAL OBSERVATION PER HR: HCPCS

## 2017-11-13 PROCEDURE — 25010000002 PERFLUTREN (DEFINITY) 8.476 MG IN SODIUM CHLORIDE 0.9 % 10 ML INJECTION: Performed by: INTERNAL MEDICINE

## 2017-11-13 PROCEDURE — 81001 URINALYSIS AUTO W/SCOPE: CPT | Performed by: HOSPITALIST

## 2017-11-13 PROCEDURE — 99214 OFFICE O/P EST MOD 30 MIN: CPT | Performed by: INTERNAL MEDICINE

## 2017-11-13 PROCEDURE — 93970 EXTREMITY STUDY: CPT

## 2017-11-13 PROCEDURE — 70551 MRI BRAIN STEM W/O DYE: CPT

## 2017-11-13 PROCEDURE — 87086 URINE CULTURE/COLONY COUNT: CPT | Performed by: HOSPITALIST

## 2017-11-13 PROCEDURE — 93010 ELECTROCARDIOGRAM REPORT: CPT | Performed by: INTERNAL MEDICINE

## 2017-11-13 PROCEDURE — 80061 LIPID PANEL: CPT | Performed by: HOSPITALIST

## 2017-11-13 PROCEDURE — 93306 TTE W/DOPPLER COMPLETE: CPT

## 2017-11-13 PROCEDURE — 99203 OFFICE O/P NEW LOW 30 MIN: CPT | Performed by: PSYCHIATRY & NEUROLOGY

## 2017-11-13 PROCEDURE — 84145 PROCALCITONIN (PCT): CPT | Performed by: HOSPITALIST

## 2017-11-13 PROCEDURE — 96361 HYDRATE IV INFUSION ADD-ON: CPT

## 2017-11-13 PROCEDURE — 85027 COMPLETE CBC AUTOMATED: CPT | Performed by: HOSPITALIST

## 2017-11-13 PROCEDURE — 93306 TTE W/DOPPLER COMPLETE: CPT | Performed by: INTERNAL MEDICINE

## 2017-11-13 PROCEDURE — 93880 EXTRACRANIAL BILAT STUDY: CPT

## 2017-11-13 PROCEDURE — 83036 HEMOGLOBIN GLYCOSYLATED A1C: CPT | Performed by: HOSPITALIST

## 2017-11-13 PROCEDURE — 80069 RENAL FUNCTION PANEL: CPT | Performed by: HOSPITALIST

## 2017-11-13 RX ADMIN — AMLODIPINE BESYLATE 5 MG: 5 TABLET ORAL at 11:50

## 2017-11-13 RX ADMIN — SODIUM CHLORIDE 75 ML/HR: 9 INJECTION, SOLUTION INTRAVENOUS at 05:14

## 2017-11-13 RX ADMIN — PERFLUTREN 2 ML: 6.52 INJECTION, SUSPENSION INTRAVENOUS at 09:57

## 2017-11-13 RX ADMIN — ISOSORBIDE MONONITRATE 30 MG: 30 TABLET ORAL at 11:50

## 2017-11-13 RX ADMIN — ATORVASTATIN CALCIUM 20 MG: 20 TABLET, FILM COATED ORAL at 11:50

## 2017-11-13 NOTE — PLAN OF CARE
Problem: Patient Care Overview (Adult)  Goal: Plan of Care Review  Outcome: Ongoing (interventions implemented as appropriate)    11/13/17 0449   Coping/Psychosocial Response Interventions   Plan Of Care Reviewed With patient;albino   Patient Care Overview   Progress improving   Outcome Evaluation   Outcome Summary/Follow up Plan patient arrived to unit from er with complaints of dyspnea and intermittent nausea, reports she had a syncopal episode earlier in the day while playing bingo. vitals remain stable. patient scheduled for multiple tests. ct chest completed yesterday as well as v/q scan for elevated d-dimer. ua with culture also sent this morning. vitals are stable. patient remains alert and oriented. up with one assist . will continue to monitor.         Problem: Fall Risk (Adult)  Goal: Identify Related Risk Factors and Signs and Symptoms  Outcome: Outcome(s) achieved Date Met:  11/13/17  Goal: Absence of Falls  Outcome: Ongoing (interventions implemented as appropriate)    Problem: Cardiac Output, Decreased (Adult)  Goal: Identify Related Risk Factors and Signs and Symptoms  Outcome: Outcome(s) achieved Date Met:  11/13/17  Goal: Adequate Cardiac Output/Effective Tissue Perfusion  Outcome: Ongoing (interventions implemented as appropriate)    Problem: Respiratory Insufficiency (Adult)  Goal: Identify Related Risk Factors and Signs and Symptoms  Outcome: Outcome(s) achieved Date Met:  11/13/17  Goal: Acid/Base Balance  Outcome: Ongoing (interventions implemented as appropriate)  Goal: Effective Ventilation  Outcome: Ongoing (interventions implemented as appropriate)

## 2017-11-13 NOTE — H&P
HISTORY AND PHYSICAL   [facility]        Patient Identification:  Name: Malika Armstrong  Age: 72 y.o.  Sex: female  :  1945  MRN: [id number]                     Primary Care Physician: Alex Bonner MD    Chief Complaint:  Passed out with transient altered mentation    History of Present Illness:   Mrs Armstrong is a wonderfully pleasant 72-year-old female who hasn't really followed up with the any doctors after previous discharge from here a year plus prior for issues with kidney stones and chronic kidney disease.  She previously had a syncopal episode this afternoon.  The son is present at bedside and he witnessed the whole event.  He states while playing bingo she became pale and was also sweating at the time.  He states she looked white as a ghost but that resided on its own with rest in a cool rag.  Then the patient had an episode for about 30 seconds in which she was unresponsive with her eyes open.  The patient states she felt like she was awake but she couldn't get her words out.  There was no tonic-clonic activity and she has no past history of any seizure disorder.  There was never any slurring of speech or focal loss of function.  Patient had other vague complaints ranging from nausea to some chills and generalized weakness that she did fill anxious at the time.  Upon coming to the ER she was found to be bradycardic and had elevated d-dimer which a VQ scan was ordered secondary to her inability to tolerate IV contrast due to her chronic kidney disease.  Currently the patient feels completely normal and back to baseline and is voicing no additional complaints.  Upon my exam her heart rates ranging in the 50s.      Past Medical History:  Past Medical History:   Diagnosis Date   • Anemia    • Asthma    • Cellulitis     legs   • Chest pain    • Edema    • Gastroenteritis    • Hypertension    • Kidney stones    • Pyelonephritis      Past Surgical History:  Past Surgical History:    Procedure Laterality Date   • CARDIAC CATHETERIZATION N/A 7/5/2016    Procedure: Left Heart Cath; no LV gram;  Surgeon: Jem Barrow MD;  Location: [facility] CATH INVASIVE LOCATION;  Service:    • CYSTOSCOPY W/ LITHOLAPAXY / EHL     • CYSTOSCOPY W/ URETERAL STENT PLACEMENT Bilateral 6/24/2016    Procedure: CYSTOSCOPY URETERAL CATHETER/STENT INSERTION;  Surgeon: Gaurav Mendoza Jr., MD;  Location: [facility] MAIN OR;  Service:    • DIAPHRAGMATIC HERNIA REPAIR     • HYSTERECTOMY        Home Meds:  Prescriptions Prior to Admission   Medication Sig Dispense Refill Last Dose   • amLODIPine (NORVASC) 5 MG tablet Take 1 tablet by mouth daily. 90 tablet 3 11/12/2017 at Unknown time   • atenolol (TENORMIN) 25 MG tablet Take 1 tablet by mouth daily. 90 tablet 3 11/12/2017 at Unknown time   • atorvastatin (LIPITOR) 20 MG tablet TAKE 1 TABLET BY MOUTH EVERY NIGHT. 90 tablet 2 11/12/2017 at Unknown time   • isosorbide mononitrate (IMDUR) 30 MG 24 hr tablet Take 1 tablet by mouth daily. 90 tablet 3 11/12/2017 at Unknown time   • nitrofurantoin (FURADANTIN) 25 MG/5ML suspension Take  by mouth 4 (Four) Times a Day.   11/12/2017 at Unknown time   • phenazopyridine (PYRIDIUM) 100 MG tablet Take 100 mg by mouth 3 (Three) Times a Day As Needed for bladder spasms.   11/12/2017 at Unknown time   • potassium citrate (UROCIT-K) 10 MEQ (1080 MG) CR tablet Take 1 tablet by mouth 3 (three) times a day. 90 tablet 3 11/12/2017 at Unknown time   • ciprofloxacin (CIPRO) 500 MG tablet Take 500 mg by mouth 2 (two) times a day.   Taking   • HYDROCODONE-ACETAMINOPHEN PO Take  by mouth.   Taking       Allergies:  Allergies   Allergen Reactions   • Codeine    • Motrin [Ibuprofen]    • Naproxen Nausea And Vomiting   • Penicillins Rash   • Sulfa Antibiotics Rash     Immunizations:  There is no immunization history for the selected administration types on file for this patient.  Social History:   Social History     Social History Narrative   • No  "narrative on file     Social History     Social History   • Marital status: Single     Spouse name: N/A   • Number of children: N/A   • Years of education: N/A     Occupational History   • Not on file.     Social History Main Topics   • Smoking status: Former Smoker   • Smokeless tobacco: Never Used      Comment: quit 2012   • Alcohol use No   • Drug use: No   • Sexual activity: Not on file     Other Topics Concern   • Not on file     Social History Narrative   • No narrative on file       Family History:  Family History   Problem Relation Age of Onset   • Adopted: Yes        Review of Systems  See history of present illness and past medical history. Patient denies any acute loss of vision smell taste or sound.  Admits to previous issues of feeling sweaty and faint but seems to remember the episode with reported syncope.  Denies any nausea vomiting dysphasia neck pain stiffness fever chills or night sweats.  Denies any chest pain palpitations cough or shortness of breath.  Denies any issues with productive cough.  Denies any abdominal pain dysuria focal loss of function or bruising or bleeding.  Remainder of ROS is negative.    Objective:  tMax 24 hrs: Temp (24hrs), Av °F (36.1 °C), Min:96.5 °F (35.8 °C), Max:97.5 °F (36.4 °C)    Vitals Ranges:   Temp:  [96.5 °F (35.8 °C)-97.5 °F (36.4 °C)] 97.5 °F (36.4 °C)  Heart Rate:  [47-53] 53  Resp:  [18] 18  BP: (103-122)/(72-76) 122/74      Exam:  /74 (BP Location: Right arm, Patient Position: Lying)  Pulse 53  Temp 97.5 °F (36.4 °C) (Oral)   Resp 18  Ht 66\" (167.6 cm)  Wt 200 lb (90.7 kg)  SpO2 94%  BMI 32.28 kg/m2    General Appearance:    Alert, cooperative, no distress, Aox3, fluent speech, not toxic   Head:    Normocephalic, without obvious abnormality, atraumatic   Eyes:    PERRL, conjunctiva/corneas clear, EOM's intact, both eyes   Ears:    Normal external ear canals, both ears   Nose:   Nares normal, septum midline, mucosa normal, no drainage    " or sinus tenderness   Throat:   Lips, mucosa, and tongue normal. Poor dentition    Neck:   Supple, No meningismus or carotid bruits or JVD       Lungs:     Clear to auscultation bilaterally, respirations unlabored   Chest Wall:    No tenderness or deformity    Heart:    Regular rate and rhythm, S1 and S2 normal, no murmur   Abdomen:     Soft, non-tender, bowel sounds active all four quadrants,     no masses, no hepatomegaly, no splenomegaly   Extremities:   Extremities normal, atraumatic, no cyanosis or edema   Pulses:   2+ and symmetric all extremities           Neurologic:   CNII-XII intact, Moving all extremities with normal strength, no focal deficits appreciated       .    Data Review:  Labs in chart were reviewed.             Imaging Results (all)     Procedure Component Value Units Date/Time    XR Chest 1 View [[id number]] Collected:  11/12/17 1700     Updated:  11/12/17 1704    Narrative:       EMERGENCY PORTAL CHEST SINGLE VIEW     HISTORY: 72-year-old female with syncope and shortness of breath     COMPARISON: [facility]     FINDINGS:  1. Limited imaging due to elevation left hemidiaphragm similar to  previous study obscuring the inferior two thirds of the left lung.  2. Mild mediastinal shift to the right.  3. Aerated portion of right lung appears grossly normal.  4. If further evaluation desired lateral view or perhaps CT would be  helpful.     This report was finalized on 11/12/2017 5:01 PM by Dr. Onesimo Hudson MD.       CT Head Without Contrast [[id number]] Collected:  11/12/17 1710     Updated:  11/12/17 1714    Narrative:       EMERGENCY NONCONTRAST HEAD CT SCAN.     HISTORY: Female who is 72 years-old, with a history of syncope.     COMPARISON: None available.     Radiation dose reduction techniques were utilized, including automated  exposure control and exposure modulation based on body size.     FINDINGS:   1. The basal cisterns and sulci over the convexities are normal for age.     2. The  ventricles are normal without displacement.   3. There is periventricular lucency consistent with chronic ischemic  change.    4. There is no evidence of an acute intracranial process.       Impression:       1. No evidence of acute intracranial process.     This report was finalized on 11/12/2017 5:11 PM by Dr. Onesimo Hudson MD.       CT Abdomen Pelvis Without Contrast [[id number]] Collected:  11/12/17 1713     Updated:  11/12/17 1736    Narrative:       EMERGENCY NONCONTRAST CT ABDOMEN AND PELVIS     HISTORY: 72-year-old female with syncope and vomiting, weakness and  chills.     PROTOCOL: Imaging was performed with standard technique.     Radiation dose reduction techniques were utilized including automated  exposure control and exposure modulation based on body size.     COMPARISON: None available     FINDINGS:  1. Prominent elevation left hemidiaphragm including stomach and colon.  Visualized portion of the aerated right lung reveal minimal atelectasis  without acute disease.  2. Gallstones without other biliary nor hepatic abnormality, normal  spleen and adjacent splenule.  3. Benign 2.5 cm left adrenal nodule, marked atrophy of the left kidney  with coarse calcifications including calcification within the renal  pelvis. Coarse calcifications within the upper pole moiety of a  duplicated right kidney which demonstrates mild cortical thinning but no  obstruction.  4. There is no obstruction, free air nor dilatation of bowel.  5. The uterus is surgically absent. There is no adnexal mass nor free  fluid  6. Degenerative changes lumbar spine with chronic compression of L1 L2  L3 and probably L4, relative canal stenosis L2-L3 with prominent  posterior spur formation. Canal stenosis L5-S1 with prominent osteophyte  extending into the bony canal from the left facet joint most pronounced  on axial image #89.     This report was finalized on 11/12/2017 5:33 PM by Dr. Onesimo Hudson MD.       NM Lung Ventilation Perfusion  [[id number]] Collected:  11/12/17 1948     Updated:  11/12/17 1954    Narrative:       NUCLEAR MEDICINE VENTILATION/PERFUSION SCAN     HISTORY: Dyspnea, pulmonary embolism     COMPARISON: Chest x-ray same day, prior VQ scan 07/03/2016     FINDINGS: Multiprojection ventilation and perfusion images of the thorax  were obtained after the inhalational administration of 4.5 mCi Xe-133  and intravenous administration of 5.0 mCi of Tc 99m MAA particles  respectively.  Diminished radiotracer activity in the left lung base  corresponds to a markedly elevated left diaphragm when correlated with  today's chest radiograph. Taking this into consideration, there is  physiologic distribution of radiotracer on both the ventilatory and  perfusion portions of the examination without a segmental or significant  subsegmental perfusion defect. There is diminished washout of xenon gas  on the ventilatory portion of the examination possibly related to air  trapping.          Impression:       1.  Exam is of low probability for pulmonary embolism     This report was finalized on 11/12/2017 7:51 PM by Kulwant Bernal MD.               Assessment:  Principal Problem:    Syncope  Active Problems:    Bradycardia    CKD (chronic kidney disease) stage 4, GFR 15-29 ml/min    Leukocytosis    D-dimer, elevated    Mediastinal shift    Medically noncompliant    HTN (hypertension)      Plan:  Syncope possibly influenced by cardiac etiology as is having issues with bradycardia at this time likely compounded by vasovagal etiology   -Hold atenolol and monitor on telemetry   -Check echocardiogram and consult cardiology for any further recommendations   -Will also check MRI of the brain tumor to rule out any neurological etiology and will get neurology to see in a.m. for any further recommendations and will give empiric ASA x1 - already on a statin and will check a FLP in am    -Check carotid Dopplers   -Check orthostatics in a.m. with gentle IV fluid  overnight    Elevated d-dimer - VQ scan results are pending as patient cannot have CT angiogram secondary to chronic kidney disease.  Given abnormalities on chest x-ray with mediastinal shift to further evaluate with a CT scan of the chest without contrast.  Check Doppler of lower extremities    CKD4 - stable - monitor and check phosphorus and magnesium with a.m. labs    Leukocytosis - check urinalysis and culture if indicated.  Check pro calcitonin and monitor wbc    While very pleasant of the patient, patient is not been medically compliant and she is not followed up with any physicians since previous discharge well greater than a year prior    SCDs for DVT prophylaxis    Further recommendations to follow as clinical course unfolds    Addendum - VQ scan with low probability    Todd Gray MD  11/12/2017  9:04 PM

## 2017-11-13 NOTE — PROGRESS NOTES
Name: Malika Armstrong ADMIT: 2017   : 1945  PCP: Alex Bonner MD    MRN: [id number] LOS: 1 days   AGE/SEX: 72 y.o. female  ROOM: [facility]/   Subjective   Subjective  CC/Reason for follow-up: syncope  No acute events.  Patient states she feels much better.  Taking PO well, no n/v/d, no HA, no dizziness, no further syncopal episodes.  She had some urinary frequency which resolved when IV fluid was stopped.  She denies dysuria, increased urgency, and incomplete emptying.  Objective   Vital Signs  Temp:  [96.5 °F (35.8 °C)-98.4 °F (36.9 °C)] 98.4 °F (36.9 °C)  Heart Rate:  [47-64] 64  Resp:  [18-20] 18  BP: (103-150)/() 150/88  SpO2:  [92 %-97 %] 97 %  on   ;   O2 Device: room air  Body mass index is 32.28 kg/(m^2).    Physical Exam   Constitutional: She is oriented to person, place, and time. No distress.   HENT:   Head: Normocephalic and atraumatic.   Mouth/Throat: Oropharynx is clear and moist.   Eyes: Conjunctivae and EOM are normal. Pupils are equal, round, and reactive to light.   Neck: Neck supple. No JVD present.   Cardiovascular: Normal rate, regular rhythm and intact distal pulses.    Pulmonary/Chest: Effort normal. She has decreased breath sounds in the right lower field and the left lower field. She has no wheezes. She has no rales.   Abdominal: Soft. Bowel sounds are normal. There is no tenderness.   Musculoskeletal: She exhibits no edema or tenderness.   Neurological: She is alert and oriented to person, place, and time. No cranial nerve deficit. She exhibits normal muscle tone.   Skin: Skin is warm and dry. No rash noted. She is not diaphoretic.   Psychiatric: She has a normal mood and affect. Her behavior is normal.   Nursing note and vitals reviewed.      Results Review:       I reviewed the patient's new clinical results.    Results from last 7 days  Lab Units 17  0510 17  1615   WBC 10*3/mm3 11.52* 11.39*   HEMOGLOBIN g/dL 13.1 13.8   PLATELETS 10*3/mm3 197 219        Results from last 7 days  Lab Units 11/13/17  0510 11/12/17  1615   SODIUM mmol/L 139 141   POTASSIUM mmol/L 4.5 3.9   CHLORIDE mmol/L 105 104   CO2 mmol/L 18.5* 24.8   BUN mg/dL 19 20   CREATININE mg/dL 1.45* 1.73*   GLUCOSE mg/dL 137* 181*   Estimated Creatinine Clearance: 40.4 mL/min (by C-G formula based on Cr of 1.45).    Results from last 7 days  Lab Units 11/13/17  0510 11/12/17  1615   CALCIUM mg/dL 9.4 9.7   ALBUMIN g/dL 3.50 3.80   MAGNESIUM mg/dL 2.1 2.1   PHOSPHORUS mg/dL 2.6  --          amLODIPine 5 mg Oral Q24H   atorvastatin 20 mg Oral Daily   isosorbide mononitrate 30 mg Oral Q24H       sodium chloride 75 mL/hr Last Rate: 75 mL/hr (11/13/17 0514)   Diet Regular; Cardiac      Assessment/Plan   Assessment:     Active Hospital Problems (** Indicates Principal Problem)    Diagnosis Date Noted   • **Syncope [R55] 11/12/2017   • Abnormal urinalysis [R82.90] 11/13/2017   • Prediabetes [R73.03] 11/13/2017   • Bradycardia [R00.1] 11/12/2017   • CKD (chronic kidney disease) stage 4, GFR 15-29 ml/min [N18.4] 11/12/2017   • Leukocytosis [D72.829] 11/12/2017   • D-dimer, elevated [R79.89] 11/12/2017   • Mediastinal shift [R93.8] 11/12/2017   • Medically noncompliant [Z91.19] 11/12/2017   • HTN (hypertension) [I10] 11/12/2017      Resolved Hospital Problems    Diagnosis Date Noted Date Resolved   No resolved problems to display.       Plan:   Syncope  -sounds vasovagal, patient also had not had anything to eat/drink for most of the day  -no events on tele, w/u negative thusfar  -Dr. Emmanuel has seen, no further cardiac workup recommended  -Neuro consulted, MRI brain ordered    Abnormal UA  -she has no symptoms and with her normal mental state I think this is very reliable  -this is asymptomatic bacteruria and does not require treatment-she also has no hardware (stents, etc) in her urinary system and no recent instrumentation  -she has a history of kidney stones and should follow up with her urologist as an  outpatient    Mediastinal Shift  -likely from known large diaphragmatic hernia seen on chest CT    Leukocytosis  -mild, stable, normal differential and no fevers  -monitor for the time being, no specific tx warranted    Bradycardia  -holding atenolol    DVT Prophylaxis  -SCDs        Disposition  Plan discharge to home tomorrow.      Kulwant Barr MD  [facility]  11/13/17  12:14 PM

## 2017-11-13 NOTE — PROGRESS NOTES
Bilateral leg venous Doppler study preliminary report: negative for dvt in both legs.  Called report to Kaylee

## 2017-11-13 NOTE — PROGRESS NOTES
Continued Stay Note  [facility]     Patient Name: Malika Armstrong  MRN: [id number]  Today's Date: 11/13/2017    Admit Date: 11/12/2017          Discharge Plan       11/13/17 1618    Case Management/Social Work Plan    Plan Home    Patient/Family In Agreement With Plan yes    Additional Comments CCP called [place] offices that pt chose, one is pediatric and the other office their is no female provider accepting new patients. CCP spoke with patient at bedside, she is agreeable for CCP to call [facility] appointment liasion to find her a female provider if not she will go to Dr. Alex Bonner. CCP called appointment liasion and left message. Reshma DIAZ/GIANCARLO      11/13/17 1601    Case Management/Social Work Plan    Plan Home- Denies any dc needs    Patient/Family In Agreement With Plan yes    Additional Comments CCP spoke with patient and son Hugo Velazquez ([phone]) at bedside, permission granted to speak in front. CCP verified facesheet and pharmacy. Pt states that she has never seen Dr. Alex Bonner and that she would like [facility] to find her new PCP that is a female. Pt prefers in the [facility] office, off of [facility]. CCP will call and see if any they have any availablitiy. CCP also discussed [facility] and pt agreeable also. Pt states she lives independently in her apartment on the 2nd floor with 7 steps to enter. Pt denies any DME, HH or SNF. Pt states she is still driving, Pt denies any living will or POA, ccp provided pamplet and explained. Pt states her plan is home at dc, and denies any dc concerns. CCP explained Observation status and MOON notice given. Pts son denies any questions. CCP to continue to follow along. Reshma DIAZ/GIANCARLO              Discharge Codes     None            Veronica Freeman, RN

## 2017-11-13 NOTE — CONSULTS
Date of Hospital Visit: 17  Encounter Provider: Ravindra Emmanuel MD  Place of Service: [facility] CARDIOLOGY  Patient Name: Malika Armstrong  :1945  [id number]  Referral Provider: Todd Gray MD    Chief complaint: Syncope    Reason for Consult:  Syncope/ bradycardia    History of Present Illness:    Mrs Armstrong is a 72 year old patient of mine with a documented history of anemia, asthma, HTN, kidney stones, and prior NSTEMI in 2016 .  Cardiac catheterization was done that showed a chronic occlusion in the LAD and distal coronary disease that is best managed medically.  She was treated medically at that time.  I last saw the patient on 2016 in follow up.  At that time she was doing well with medical therapy.  After reviewing her cardiac catheterization, I thought it was possible that we could do  intervention on her LAD if needed.  She was to follow up with me in a year.     She presented to ER yesterday after a syncopal episode.  The patient was playing bingo when she began to feel hot and was pale. Her family applied a cold rag to her head and it improved her symptoms for a short time.  Her family witnessed syncopal episode that lasted about 30 seconds in the car where her eyes rolled back in her head.   She was unresponsive with her eyes open.  She had not eaten prior to this event because she states she forgot.  She had associated severe nausea without vomiting, chill, and weakness.  Upon arrival to the ED her HR was 47, O2 sats 95%, blood sugar= 181,   BUN/Creat 19/1.45.  Per report her EKG showed sinus bradycardia with HR 48. CT of head and VQ scan were done and were both negative. Troponin negative x 1, D-dimer= 0.61.  She has been taking Atenolol at home. She was admitted for further evaluation.    This morning she is feeling much better.  She denies any weakness, dizziness, or chest pain or palpitations. He nausea is resolved.  Her B/P is  147/82, HR 59.  Orthostatic blood pressures are pending.      Previous cardiac testing:    Cardiac Cath 07/05/2016  Conclusions:   1. Left main: Mild luminal irregularities  2. LAD: Proximal segment normal.  Chronic total occlusion mid segment.  Small caliber D1 with 95% proximal stenosis.  DM caliber D2 with 90% proximal stenosis.  Distal apical vessel fills via right to left collaterals  3. LCX: 30% OM1 stenosis.  4. RCA: 60%  mid vessel stenosis.  70% mid PDA stenosis.  70-80% RPL 1 stenosis in proximal segment.    5. No aortic stenosis.          Recommendations: At this time I would recommend medical management.  Could consider  intervention of the LAD in the future.     ECHO 07/03/2016  · Left ventricular function is normal. Estimated EF = 61%.      There is no significant valvular heart disease.      Past Medical History:   Diagnosis Date   • Anemia    • Asthma    • Cellulitis     legs   • Chest pain    • Edema    • Gastroenteritis    • Hypertension    • Kidney stones    • Pyelonephritis        Past Surgical History:   Procedure Laterality Date   • CARDIAC CATHETERIZATION N/A 7/5/2016    Procedure: Left Heart Cath; no LV gram;  Surgeon: Jem Barrow MD;  Location: [facility] INVASIVE LOCATION;  Service:    • CYSTOSCOPY W/ LITHOLAPAXY / EHL     • CYSTOSCOPY W/ URETERAL STENT PLACEMENT Bilateral 6/24/2016    Procedure: CYSTOSCOPY URETERAL CATHETER/STENT INSERTION;  Surgeon: Gaurav Mendoza Jr., MD;  Location: [facility] OR;  Service:    • DIAPHRAGMATIC HERNIA REPAIR     • HYSTERECTOMY         Prescriptions Prior to Admission   Medication Sig Dispense Refill Last Dose   • amLODIPine (NORVASC) 5 MG tablet Take 1 tablet by mouth daily. 90 tablet 3 11/12/2017 at Unknown time   • atenolol (TENORMIN) 25 MG tablet Take 1 tablet by mouth daily. 90 tablet 3 11/12/2017 at Unknown time   • atorvastatin (LIPITOR) 20 MG tablet TAKE 1 TABLET BY MOUTH EVERY NIGHT. 90 tablet 2 11/12/2017 at Unknown time   •  isosorbide mononitrate (IMDUR) 30 MG 24 hr tablet Take 1 tablet by mouth daily. 90 tablet 3 11/12/2017 at Unknown time   • nitrofurantoin (FURADANTIN) 25 MG/5ML suspension Take  by mouth 4 (Four) Times a Day.   11/12/2017 at Unknown time   • phenazopyridine (PYRIDIUM) 100 MG tablet Take 100 mg by mouth 3 (Three) Times a Day As Needed for bladder spasms.   11/12/2017 at Unknown time   • potassium citrate (UROCIT-K) 10 MEQ (1080 MG) CR tablet Take 1 tablet by mouth 3 (three) times a day. 90 tablet 3 11/12/2017 at Unknown time   • ciprofloxacin (CIPRO) 500 MG tablet Take 500 mg by mouth 2 (two) times a day.   Taking   • HYDROCODONE-ACETAMINOPHEN PO Take  by mouth.   Taking       Current Meds  Scheduled Meds:    amLODIPine 5 mg Oral Q24H   atorvastatin 20 mg Oral Daily   isosorbide mononitrate 30 mg Oral Q24H     Continuous Infusions:    sodium chloride 75 mL/hr Last Rate: 75 mL/hr (11/13/17 0514)     PRN Meds:.•  acetaminophen  •  influenza vaccine  •  nitroglycerin  •  ondansetron  •  pneumococcal polysaccharide 23-valent  •  sodium chloride  •  Insert peripheral IV **AND** sodium chloride    Allergies as of 11/12/2017 - Gary as Reviewed 11/12/2017   Allergen Reaction Noted   • Codeine  08/05/2016   • Motrin [ibuprofen]  06/24/2016   • Naproxen Nausea And Vomiting 08/05/2016   • Penicillins Rash 06/24/2016   • Sulfa antibiotics Rash 06/24/2016       Social History     Social History   • Marital status: Single     Spouse name: N/A   • Number of children: N/A   • Years of education: N/A     Occupational History   • Not on file.     Social History Main Topics   • Smoking status: Former Smoker   • Smokeless tobacco: Never Used      Comment: quit jan 2012   • Alcohol use No   • Drug use: No   • Sexual activity: Not on file     Other Topics Concern   • Not on file     Social History Narrative   • No narrative on file       Family History   Problem Relation Age of Onset   • Adopted: Yes       REVIEW OF SYSTEMS:   ROS was  "performed and is negative except as outlined in HPI     REVIEW OF SYSTEMS:   CONSTITUTIONAL: No weight loss, fever, chills, weakness or fatigue.   HEENT: Eyes: No visual loss, blurred vision, double vision or yellow sclerae. Ears, Nose, Throat: No hearing loss, sneezing, congestion, runny nose or sore throat.   SKIN: No rash or itching.     RESPIRATORY: No shortness of breath, hemoptysis, cough or sputum.   GASTROINTESTINAL: No anorexia, nausea, vomiting or diarrhea. No abdominal pain, bright red blood per rectum or melena.  GENITOURINARY: No burning on urination, hematuria or increased frequency.  NEUROLOGICAL: No headache, dizziness,+ syncope, paralysis, ataxia, numbness or tingling in the extremities. No change in bowel or bladder control.   MUSCULOSKELETAL: No muscle, back pain, joint pain or stiffness.   HEMATOLOGIC: No anemia, bleeding or bruising.   LYMPHATICS: No enlarged nodes. No history of splenectomy.   PSYCHIATRIC: No history of depression, anxiety, hallucinations.   ENDOCRINOLOGIC: No reports of sweating, cold or heat intolerance. No polyuria or polydipsia.       Objective:   Temp:  [96.5 °F (35.8 °C)-98.4 °F (36.9 °C)] 98.4 °F (36.9 °C)  Heart Rate:  [47-59] 57  Resp:  [18-20] 18  BP: (103-147)/() 147/82  Body mass index is 32.28 kg/(m^2).  Flowsheet Rows         First Filed Value    Admission Height  66\" (167.6 cm) Documented at 11/12/2017 1550    Admission Weight  200 lb (90.7 kg) Documented at 11/12/2017 1550        Vitals:    11/13/17 0700   BP: 147/82   Pulse: 57   Resp: 18   Temp: 98.4 °F (36.9 °C)   SpO2: 97%       Head:    Normocephalic, without obvious abnormality, atraumatic, obese   Eyes:            Lids and lashes normal, conjunctivae and sclerae normal, no   icterus, no pallor   Ears:    Ears appear intact with no abnormalities noted   Throat:   No oral lesions, dentition good   Neck:   No adenopathy, supple, trachea midline, no thyromegaly, no   carotid bruit, no JVD   Lungs:     " Breath sounds are equal and clear to auscultation    Heart:    Normal S1 and S2, RRR, No M/G/R   Abdomen:     Normal bowel sounds, no masses, no organomegaly, soft        non-tender, non-distended, no guarding   Extremities:   Moves all extremities well, no edema, no cyanosis, no redness   Pulses:   Pulses palpable and equal bilaterally.    Skin:  Psychiatric:   No bleeding, bruising or rash    Awake, alert and oriented x 3, normal mood and affect           Chest Xray  FINDINGS:  1. Limited imaging due to elevation left hemidiaphragm similar to  previous study obscuring the inferior two thirds of the left lung.  2. Mild mediastinal shift to the right.  3. Aerated portion of right lung appears grossly normal.  4. If further evaluation desired lateral view or perhaps CT would be  Helpful.    CT of Head   FINDINGS:   1. The basal cisterns and sulci over the convexities are normal for age.      2. The ventricles are normal without displacement.   3. There is periventricular lucency consistent with chronic ischemic  change.    4. There is no evidence of an acute intracranial process.      IMPRESSION:  1. No evidence of acute intracranial process.    V/Q scan 11/012/2017  IMPRESSION:  1.  Exam is of low probability for pulmonary embolism    Telemetry    EKG 11/013/2017      EKG 08/05/2016      I personally viewed and interpreted the patient's EKG/Telemetry data    Assessment:  Active Hospital Problems (** Indicates Principal Problem)    Diagnosis Date Noted   • **Syncope [R55] 11/12/2017   • Bradycardia [R00.1] 11/12/2017   • CKD (chronic kidney disease) stage 4, GFR 15-29 ml/min [N18.4] 11/12/2017   • Leukocytosis [D72.829] 11/12/2017   • D-dimer, elevated [R79.89] 11/12/2017   • Mediastinal shift [R93.8] 11/12/2017   • Medically noncompliant [Z91.19] 11/12/2017   • HTN (hypertension) [I10] 11/12/2017      Resolved Hospital Problems    Diagnosis Date Noted Date Resolved   No resolved problems to display.       Plan:This  is a lady who had not eaten breakfast or lunch yesterday appears to have a UTI has not done great with follow-up medical care.  She has all the symptoms of a classic vasovagal syncopal episode with the prewarning and nausea diaphoresis.  She is ruled out for an MI.  EKG is unchanged.  I would consider treating her UTI holding the atenolol she really is probably only a medical therapy patient from her coronary disease which I don't think is playing a role in what's going on right now I think it's important that she stay hydrated I do not think she needs an MRI or ultrasound of her carotid arteries    Ravindra Emmanuel MD  11/13/17  9:26 AM.

## 2017-11-13 NOTE — CONSULTS
Patient Identification:  NAME:  Malika Armstrong  Age:  72 y.o.   Sex:  female   :  1945   MRN:  [id number]       Chief complaint: I don't remember, reason for consult syncope    History of present illness:  This patient is a 72-year-old right-handed white female to history of cellulitis chest pain edema hypertension kidney stones who apparently was with her son and playing bingo she did feel lightheaded dizzy and was walking out to the car she recalls this but apparently once she sat down in his car she thought it helped but she still lost consciousness about a minute or so when she kind of came around she looked hot sweaty cold clammy and very pale according to the son she did not bite her tongue there was no witnessed seizure activity duration about a minute or or longer context patient who did feel it coming on to some extent associated symptoms none but she's had some neck pain in the past and some occipital neuralgia type pain in the right or left parietal regions of the head but not really now modifying factors at least sitting down seems to help at first      Past medical history:  Past Medical History:   Diagnosis Date   • Anemia    • Asthma    • Cellulitis     legs   • Chest pain    • Edema    • Gastroenteritis    • Hypertension    • Kidney stones    • Pyelonephritis        Past surgical history:  Past Surgical History:   Procedure Laterality Date   • CARDIAC CATHETERIZATION N/A 2016    Procedure: Left Heart Cath; no LV gram;  Surgeon: Jem Barrow MD;  Location: [facility] INVASIVE LOCATION;  Service:    • CYSTOSCOPY W/ LITHOLAPAXY / EHL     • CYSTOSCOPY W/ URETERAL STENT PLACEMENT Bilateral 2016    Procedure: CYSTOSCOPY URETERAL CATHETER/STENT INSERTION;  Surgeon: Gaurav Mendoza Jr., MD;  Location: [facility] OR;  Service:    • DIAPHRAGMATIC HERNIA REPAIR     • HYSTERECTOMY         Allergies:  Codeine; Motrin [ibuprofen]; Naproxen; Penicillins; and Sulfa antibiotics    Home  medications:  Prescriptions Prior to Admission   Medication Sig Dispense Refill Last Dose   • amLODIPine (NORVASC) 5 MG tablet Take 1 tablet by mouth daily. 90 tablet 3 11/12/2017 at Unknown time   • atenolol (TENORMIN) 25 MG tablet Take 1 tablet by mouth daily. 90 tablet 3 11/12/2017 at Unknown time   • atorvastatin (LIPITOR) 20 MG tablet TAKE 1 TABLET BY MOUTH EVERY NIGHT. 90 tablet 2 11/12/2017 at Unknown time   • isosorbide mononitrate (IMDUR) 30 MG 24 hr tablet Take 1 tablet by mouth daily. 90 tablet 3 11/12/2017 at Unknown time   • nitrofurantoin (FURADANTIN) 25 MG/5ML suspension Take  by mouth 4 (Four) Times a Day.   11/12/2017 at Unknown time   • phenazopyridine (PYRIDIUM) 100 MG tablet Take 100 mg by mouth 3 (Three) Times a Day As Needed for bladder spasms.   11/12/2017 at Unknown time   • potassium citrate (UROCIT-K) 10 MEQ (1080 MG) CR tablet Take 1 tablet by mouth 3 (three) times a day. 90 tablet 3 11/12/2017 at Unknown time   • ciprofloxacin (CIPRO) 500 MG tablet Take 500 mg by mouth 2 (two) times a day.   Taking   • HYDROCODONE-ACETAMINOPHEN PO Take  by mouth.   Taking        Hospital medications:    amLODIPine 5 mg Oral Q24H   atorvastatin 20 mg Oral Daily   isosorbide mononitrate 30 mg Oral Q24H       sodium chloride 75 mL/hr Last Rate: 75 mL/hr (11/13/17 0514)     •  acetaminophen  •  influenza vaccine  •  nitroglycerin  •  ondansetron  •  pneumococcal polysaccharide 23-valent  •  sodium chloride  •  Insert peripheral IV **AND** sodium chloride    Family history:  Family History   Problem Relation Age of Onset   • Adopted: Yes       Social history:  Social History   Substance Use Topics   • Smoking status: Former Smoker   • Smokeless tobacco: Never Used      Comment: quit jan 2012   • Alcohol use No       Review of systems:    No hair eyes ears nose throat skin bone joint  lymphatic hematologic or oncologic complaints no significant back pain chest pain abdominal pain bowel bladder incontinence  fever chills or rash she has some occipital type headaches    Objective:  Vitals Ranges:   Temp:  [96.5 °F (35.8 °C)-98.4 °F (36.9 °C)] 97.9 °F (36.6 °C)  Heart Rate:  [47-64] 57  Resp:  [18-20] 18  BP: (103-158)/() 148/82      Physical Exam:She is awake alert and moderately well oriented very pleasant and makes a few jokes fund of knowledge good attention span and concentration good recent and remote memory fair language function normal well-developed well-nourished in no distress pupils to constricting to one half normal disc retinas visual fields extraocular movements full without nystagmus nasolabial folds palate tongue symmetrical normal hearing facial sensation head turning shoulder shrug motor 5 out of 5 times for extremities no pronator drift atrophy fasciculations rigidity bradykinesia reflexes trace throughout symmetrical toes downgoing bilaterally sensation normal light touch face both arms both legs coordination normal in the upper and lower extremities she is able to ambulate without ataxia somewhat widened base heart regular without murmur neck supple without bruits extremities no clubbing cyanosis edema visual acuity normal at 3 feet awake alert oriented ×3    Results review:   I reviewed the patient's new clinical results.    Data review:  Lab Results (last 24 hours)     Procedure Component Value Units Date/Time    CBC & Differential [[id number]] Collected:  11/12/17 1615    Specimen:  Blood Updated:  11/12/17 1629    Narrative:       The following orders were created for panel order CBC & Differential.  Procedure                               Abnormality         Status                     ---------                               -----------         ------                     CBC Auto Differential[[id number]]        Abnormal            Final result                 Please view results for these tests on the individual orders.    CBC Auto Differential [[id number]]  (Abnormal) Collected:  11/12/17  1615    Specimen:  Blood Updated:  11/12/17 1629     WBC 11.39 (H) 10*3/mm3      RBC 5.00 10*6/mm3      Hemoglobin 13.8 g/dL      Hematocrit 44.4 %      MCV 88.8 fL      MCH 27.6 pg      MCHC 31.1 (L) g/dL      RDW 14.4 (H) %      RDW-SD 46.8 fl      MPV 10.0 fL      Platelets 219 10*3/mm3      Neutrophil % 70.0 %      Lymphocyte % 22.4 %      Monocyte % 6.5 %      Eosinophil % 0.5 %      Basophil % 0.3 %      Immature Grans % 0.3 %      Neutrophils, Absolute 7.98 10*3/mm3      Lymphocytes, Absolute 2.55 10*3/mm3      Monocytes, Absolute 0.74 10*3/mm3      Eosinophils, Absolute 0.06 10*3/mm3      Basophils, Absolute 0.03 10*3/mm3      Immature Grans, Absolute 0.03 10*3/mm3     aPTT [[id number]]  (Normal) Collected:  11/12/17 1615    Specimen:  Blood Updated:  11/12/17 1642     PTT 22.9 seconds     Protime-INR [[id number]]  (Normal) Collected:  11/12/17 1615    Specimen:  Blood Updated:  11/12/17 1642     Protime 13.3 Seconds      INR 1.05    D-dimer, Quantitative [[id number]]  (Abnormal) Collected:  11/12/17 1615    Specimen:  Blood Updated:  11/12/17 1642     D-Dimer, Quantitative 0.61 (H) MCGFEU/mL     Narrative:       The Stago D-Dimer test used in conjunction with a clinical pretest probability (PTP) assessment model, has been approved by the FDA to rule out the presence of venous thromboembolism (VTE) in outpatients suspected of deep venous thrombosis (DVT) or pulmonary embolism (PE).     Troponin [[id number]]  (Normal) Collected:  11/12/17 1615    Specimen:  Blood Updated:  11/12/17 1653     Troponin T <0.010 ng/mL     Narrative:       Troponin T Reference Ranges:  Less than 0.03 ng/mL:    Negative for AMI  0.03 to 0.09 ng/mL:      Indeterminant for AMI  Greater than 0.09 ng/mL: Positive for AMI    CK [[id number]]  (Normal) Collected:  11/12/17 1615    Specimen:  Blood Updated:  11/12/17 1653     Creatine Kinase 27 U/L     Magnesium [[id number]]  (Normal) Collected:  11/12/17 1615    Specimen:  Blood Updated:   11/12/17 1653     Magnesium 2.1 mg/dL     Comprehensive Metabolic Panel [[id number]]  (Abnormal) Collected:  11/12/17 1615    Specimen:  Blood Updated:  11/12/17 1654     Glucose 181 (H) mg/dL      BUN 20 mg/dL      Creatinine 1.73 (H) mg/dL      Sodium 141 mmol/L      Potassium 3.9 mmol/L      Chloride 104 mmol/L      CO2 24.8 mmol/L      Calcium 9.7 mg/dL      Total Protein 6.8 g/dL      Albumin 3.80 g/dL      ALT (SGPT) 10 U/L      AST (SGOT) 11 U/L      Alkaline Phosphatase 64 U/L      Total Bilirubin 1.1 mg/dL      eGFR Non African Amer 29 (L) mL/min/1.73      Globulin 3.0 gm/dL      A/G Ratio 1.3 g/dL      BUN/Creatinine Ratio 11.6     Anion Gap 12.2 mmol/L     Narrative:       The MDRD GFR formula is only valid for adults with stable renal function between ages 18 and 70.    Urine Culture - Urine, Urine, Clean Catch [[id number]] Collected:  11/13/17 0342    Specimen:  Urine from Urine, Clean Catch Updated:  11/13/17 0406    Urinalysis With / Culture If Indicated - Urine, Clean Catch [[id number]]  (Abnormal) Collected:  11/13/17 0342    Specimen:  Urine from Urine, Clean Catch Updated:  11/13/17 0425     Color, UA Orange (A)      Any Substance that causes an abnormal urine color can alter the accuracy of the chemical reactions.        Appearance, UA Cloudy (A)     pH, UA <=5.0     Specific Gravity, UA 1.015     Glucose, UA Negative     Ketones, UA Trace (A)     Bilirubin, UA Negative     Blood, UA Trace (A)     Protein, UA Negative     Leuk Esterase, UA Moderate (2+) (A)     Nitrite, UA Positive (A)     Urobilinogen, UA 2.0 E.U./dL (A)    Urinalysis, Microscopic Only - Urine, Clean Catch [[id number]]  (Abnormal) Collected:  11/13/17 0342    Specimen:  Urine from Urine, Clean Catch Updated:  11/13/17 0425     RBC, UA 3-5 (A) /HPF      WBC, UA 21-30 (A) /HPF      Bacteria, UA 1+ (A) /HPF      Squamous Epithelial Cells, UA 0-2 /HPF      Hyaline Casts, UA 0-2 /LPF      Amorphous Crystals, UA Small/1+ /HPF       Methodology Manual Light Microscopy    CBC (No Diff) [[id number]]  (Abnormal) Collected:  11/13/17 0510    Specimen:  Blood Updated:  11/13/17 0531     WBC 11.52 (H) 10*3/mm3      RBC 4.74 10*6/mm3      Hemoglobin 13.1 g/dL      Hematocrit 42.5 %      MCV 89.7 fL      MCH 27.6 pg      MCHC 30.8 (L) g/dL      RDW 14.7 (H) %      RDW-SD 48.2 fl      MPV 9.9 fL      Platelets 197 10*3/mm3     Magnesium [[id number]]  (Normal) Collected:  11/13/17 0510    Specimen:  Blood Updated:  11/13/17 0552     Magnesium 2.1 mg/dL     Lipid Panel [[id number]]  (Abnormal) Collected:  11/13/17 0510    Specimen:  Blood Updated:  11/13/17 0552     Total Cholesterol 196 mg/dL      Triglycerides 123 mg/dL      HDL Cholesterol 58 mg/dL      LDL Cholesterol  113 (H) mg/dL      VLDL Cholesterol 24.6 mg/dL      LDL/HDL Ratio 1.96    Narrative:       Cholesterol Reference Ranges  (U.S. Department of Health and Human Services ATP III Classifications)    Desirable          <200 mg/dL  Borderline High    200-239 mg/dL  High Risk          >240 mg/dL      Triglyceride Reference Ranges  (U.S. Department of Health and Human Services ATP III Classifications)    Normal           <150 mg/dL  Borderline High  150-199 mg/dL  High             200-499 mg/dL  Very High        >500 mg/dL    HDL Reference Ranges  (U.S. Department of Health and Human Services ATP III Classifcations)    Low     <40 mg/dl (major risk factor for CHD)  High    >60 mg/dl ('negative' risk factor for CHD)        LDL Reference Ranges  (U.S. Department of Health and Human Services ATP III Classifcations)    Optimal          <100 mg/dL  Near Optimal     100-129 mg/dL  Borderline High  130-159 mg/dL  High             160-189 mg/dL  Very High        >189 mg/dL    Hemoglobin A1c [[id number]]  (Abnormal) Collected:  11/13/17 0510    Specimen:  Blood Updated:  11/13/17 0554     Hemoglobin A1C 5.99 (H) %     Narrative:       Hemoglobin A1C Ranges:    Increased Risk for Diabetes  5.7% to  "6.4%  Diabetes                     >= 6.5%  Diabetic Goal                < 7.0%    Renal Function Panel [[id number]]  (Abnormal) Collected:  11/13/17 0510    Specimen:  Blood Updated:  11/13/17 0554     Glucose 137 (H) mg/dL      BUN 19 mg/dL      Creatinine 1.45 (H) mg/dL      Sodium 139 mmol/L      Potassium 4.5 mmol/L      Chloride 105 mmol/L      CO2 18.5 (L) mmol/L      Calcium 9.4 mg/dL      Albumin 3.50 g/dL      Phosphorus 2.6 mg/dL      Anion Gap 15.5 mmol/L      BUN/Creatinine Ratio 13.1     eGFR Non African Amer 36 (L) mL/min/1.73     Narrative:       The MDRD GFR formula is only valid for adults with stable renal function between ages 18 and 70.    Procalcitonin [[id number]]  (Abnormal) Collected:  11/13/17 0510    Specimen:  Blood Updated:  11/13/17 0557     Procalcitonin 0.05 (L) ng/mL     Narrative:       As a Marker for Sepsis (Non-Neonates):   1. <0.5 ng/mL represents a low risk of severe sepsis and/or septic shock.  1. >2 ng/mL represents a high risk of severe sepsis and/or septic shock.    As a Marker for Lower Respiratory Tract Infections that require antibiotic therapy:  PCT on Admission     Antibiotic Therapy             6-12 Hrs later  > 0.5                Strongly Recommended            >0.25 - <0.5         Recommended  0.1 - 0.25           Discouraged                   Remeasure/reassess PCT  <0.1                 Strongly Discouraged          Remeasure/reassess PCT      As 28 day mortality risk marker: \"Change in Procalcitonin Result\" (> 80 % or <=80 %) if Day 0 (or Day 1) and Day 4 values are available. Refer to http://www.[employer]-pct-calculator.com/   Change in PCT <=80 %   A decrease of PCT levels below or equal to 80 % defines a positive change in PCT test result representing a higher risk for 28-day all-cause mortality of patients diagnosed with severe sepsis or septic shock.  Change in PCT > 80 %   A decrease of PCT levels of more than 80 % defines a negative change in PCT result " representing a lower risk for 28-day all-cause mortality of patients diagnosed with severe sepsis or septic shock.                       Imaging:  Imaging Results (last 24 hours)     Procedure Component Value Units Date/Time    XR Chest 1 View [[id number]] Collected:  11/12/17 1700     Updated:  11/12/17 1704    Narrative:       EMERGENCY PORTAL CHEST SINGLE VIEW     HISTORY: 72-year-old female with syncope and shortness of breath     COMPARISON: [place] [id number]     FINDINGS:  1. Limited imaging due to elevation left hemidiaphragm similar to  previous study obscuring the inferior two thirds of the left lung.  2. Mild mediastinal shift to the right.  3. Aerated portion of right lung appears grossly normal.  4. If further evaluation desired lateral view or perhaps CT would be  helpful.     This report was finalized on 11/12/2017 5:01 PM by Dr. Onesimo Hudson MD.       CT Head Without Contrast [[id number]] Collected:  11/12/17 1710     Updated:  11/12/17 1714    Narrative:       EMERGENCY NONCONTRAST HEAD CT SCAN.     HISTORY: Female who is 72 years-old, with a history of syncope.     COMPARISON: None available.     Radiation dose reduction techniques were utilized, including automated  exposure control and exposure modulation based on body size.     FINDINGS:   1. The basal cisterns and sulci over the convexities are normal for age.     2. The ventricles are normal without displacement.   3. There is periventricular lucency consistent with chronic ischemic  change.    4. There is no evidence of an acute intracranial process.       Impression:       1. No evidence of acute intracranial process.     This report was finalized on 11/12/2017 5:11 PM by Dr. Onesimo Hudson MD.       CT Abdomen Pelvis Without Contrast [[id number]] Collected:  11/12/17 1713     Updated:  11/12/17 1736    Narrative:       EMERGENCY NONCONTRAST CT ABDOMEN AND PELVIS     HISTORY: 72-year-old female with syncope and vomiting, weakness and  chills.      PROTOCOL: Imaging was performed with standard technique.     Radiation dose reduction techniques were utilized including automated  exposure control and exposure modulation based on body size.     COMPARISON: None available     FINDINGS:  1. Prominent elevation left hemidiaphragm including stomach and colon.  Visualized portion of the aerated right lung reveal minimal atelectasis  without acute disease.  2. Gallstones without other biliary nor hepatic abnormality, normal  spleen and adjacent splenule.  3. Benign 2.5 cm left adrenal nodule, marked atrophy of the left kidney  with coarse calcifications including calcification within the renal  pelvis. Coarse calcifications within the upper pole moiety of a  duplicated right kidney which demonstrates mild cortical thinning but no  obstruction.  4. There is no obstruction, free air nor dilatation of bowel.  5. The uterus is surgically absent. There is no adnexal mass nor free  fluid  6. Degenerative changes lumbar spine with chronic compression of L1 L2  L3 and probably L4, relative canal stenosis L2-L3 with prominent  posterior spur formation. Canal stenosis L5-S1 with prominent osteophyte  extending into the bony canal from the left facet joint most pronounced  on axial image #89.     This report was finalized on 11/12/2017 5:33 PM by Dr. Onesimo Hudson MD.       NM Lung Ventilation Perfusion [[id number]] Collected:  11/12/17 1948     Updated:  11/12/17 1954    Narrative:       NUCLEAR MEDICINE VENTILATION/PERFUSION SCAN     HISTORY: Dyspnea, pulmonary embolism     COMPARISON: Chest x-ray same day, prior VQ scan 07/03/2016     FINDINGS: Multiprojection ventilation and perfusion images of the thorax  were obtained after the inhalational administration of 4.5 mCi Xe-133  and intravenous administration of 5.0 mCi of Tc 99m MAA particles  respectively.  Diminished radiotracer activity in the left lung base  corresponds to a markedly elevated left diaphragm when correlated  with  today's chest radiograph. Taking this into consideration, there is  physiologic distribution of radiotracer on both the ventilatory and  perfusion portions of the examination without a segmental or significant  subsegmental perfusion defect. There is diminished washout of xenon gas  on the ventilatory portion of the examination possibly related to air  trapping.          Impression:       1.  Exam is of low probability for pulmonary embolism     This report was finalized on 11/12/2017 7:51 PM by Kulwant Bernal MD.       CT Chest Without Contrast [[id number]] Collected:  11/12/17 2224     Updated:  11/13/17 0544    Narrative:       THORACIC CT SCAN WITHOUT CONTRAST     HISTORY: Mediastinal shift; R55-Syncope and collapse; R00.1-Bradycardia,  unspecified; R79.89-Other specified abnormal findings of blood  chemistry/     COMPARISON: 01/22/2014.     TECHNIQUE:  Radiation dose reduction techniques were utilized, including  automated exposure control and exposure modulation based on body size.  Axial images of the thorax obtained without IV contrast, per request.     FINDINGS: Redemonstrated is a large left-sided diaphragmatic hernia  which contains essentially the entire stomach as well as a large loop of  colon and small portion of the pancreas. It is unchanged compared to  previous. There is adjacent atelectasis of the left lung and right lower  lobe. There is some left upper lobe atelectasis as well. There is no  convincing evidence of active air space disease process otherwise. There  are calcified residua of granulomatous disease present. There is no  pleural fluid. No obvious adenopathy by noncontrast technique. Normal  heart size. Multiple gallstones are present in a nondistended  gallbladder. There is marked renal atrophy. There is a low-density left  adrenal nodule, likely small adenoma. It was present on a prior  abdominal CT from 01/22/2014. There are nonobstructing renal calculi.     Unenhanced images of  the included upper abdomen are otherwise  unremarkable.       Impression:       1. Stable very large diaphragmatic hernia with adjacent atelectasis.  2. Stable left adrenal nodule, likely adenoma.  3. Cholelithiasis.  4. Bilateral nephrolithiasis.      This study was performed with techniques to keep radiation doses as low  as reasonably achievable (ALARA). Individualized dose reduction  techniques using automated exposure control or adjustment of mA and/or  kV according to the patient size were employed.      This report was finalized on 11/13/2017 5:41 AM by Kulwant Bernal MD.       MRI Brain Without Contrast [[id number]] Collected:  11/13/17 1408     Updated:  11/13/17 1408    Narrative:       MRI OF THE BRAIN WITHOUT CONTRAST     HISTORY: Cold sweat, nausea last night.     MRI of the brain was obtained with sagittal T1, axial T1, axial FLAIR,  axial T2, axial diffusion, and axial gray neck images.     FINDINGS:     The ventricles, sulci, and cisterns are age appropriate. There is an  abnormal appearance of the flow-voids of the cranial segment of the  right vertebral artery which may be proximally stenotic or occluded.  Further evaluation could be performed with a CT angiogram if clinically  indicated. Mild-to-moderate changes of chronic small vessel ischemic  phenomena are noted. Note is made of a 3 mm focus of susceptibility  artifact within the inferior aspect of the right frontal lobe which is  compatible with a chronic microhemorrhage and in turn is likely due to  amyloid angiopathy.       Impression:       1.  Abnormal flow void within the intracranial segment of the right  vertebral artery which is suggestive of a proximal severe stenosis or  complete occlusion. Further evaluation could be performed with a CT  angiogram if clinically indicated.  2.  Mild-to-moderate changes of chronic small vessel ischemic phenomena.  3.  Findings compatible with a punctate chronic microhemorrhage within  the inferior  aspect of the right frontal lobe.                Assessment and Plan:     Principal Problem:    Syncope  Active Problems:    Bradycardia    CKD (chronic kidney disease) stage 4, GFR 15-29 ml/min    Leukocytosis    D-dimer, elevated    Mediastinal shift    Medically noncompliant    HTN (hypertension)    Abnormal urinalysis    Prediabetes    This patient is suffered an orthostatic drop in her blood pressure associated with vertebrobasilar insufficiency remaining upright in the car and prolonged syncope.  She is had some bilateral occipital neuralgia as well secondary to cervical spondylosis.  Nonetheless there is no evidence of an acute stroke TIA or primary seizure syndrome and I am okay with her being discharged home      Gaurav Phoenix MD  11/13/17  3:36 PM

## 2017-11-13 NOTE — PLAN OF CARE
Problem: Patient Care Overview (Adult)  Goal: Plan of Care Review  Outcome: Ongoing (interventions implemented as appropriate)    11/13/17 8148   Coping/Psychosocial Response Interventions   Plan Of Care Reviewed With patient   Patient Care Overview   Progress no change   Outcome Evaluation   Outcome Summary/Follow up Plan Patient alert and pleasant. MRI ordered and completed with proximal severe stenosis or complete occlusion and ct angiogram suggested for further evaluation. TTE ordered and completed with EF of 50.5% noted. carotid dopplers ordered and completed finding proximal left and right internal carotid with mild stenosis. Venous dopplers ordered and completed and were negative for DVT. Vitals stable. Will continue to monitor.         Problem: Fall Risk (Adult)  Goal: Absence of Falls  Outcome: Ongoing (interventions implemented as appropriate)    Problem: Cardiac Output, Decreased (Adult)  Goal: Adequate Cardiac Output/Effective Tissue Perfusion  Outcome: Ongoing (interventions implemented as appropriate)    Problem: Respiratory Insufficiency (Adult)  Goal: Acid/Base Balance  Outcome: Ongoing (interventions implemented as appropriate)  Goal: Effective Ventilation  Outcome: Ongoing (interventions implemented as appropriate)

## 2017-11-13 NOTE — PROGRESS NOTES
Discharge Planning Assessment  [facility]     Patient Name: Malika Armstrong  MRN: [id number]  Today's Date: 11/13/2017    Admit Date: 11/12/2017          Discharge Needs Assessment       11/13/17 1605    Living Environment    Lives With child(funmilayo), adult    Living Arrangements apartment    Home Accessibility stairs (2 railings present);stairs to enter home    Number of Stairs to Enter Home 7    Stair Railings at Home outside, present at both sides    Type of Financial/Environmental Concern none    Transportation Available family or friend will provide;car    Living Environment    Provides Primary Care For no one    Quality Of Family Relationships supportive    Able to Return to Prior Living Arrangements yes    Discharge Needs Assessment    Concerns To Be Addressed denies needs/concerns at this time;compliance issue concerns;discharge planning concerns    Concerns Comments New PCP    Readmission Within The Last 30 Days no previous admission in last 30 days    Anticipated Changes Related to Illness none    Equipment Currently Used at Home none    Equipment Needed After Discharge none    Discharge Disposition still a patient            Discharge Plan       11/13/17 1618    Case Management/Social Work Plan    Plan Home    Patient/Family In Agreement With Plan yes    Additional Comments CCP called [place] offices that pt chose, one is pediatric and the other office their is no female provider accepting new patients. CCP spoke with patient at bedside, she is agreeable for CCP to call [facility] appointment liasion to find her a female provider if not she will go to Dr. Alex Bonner. CCP called appointment liasion and left message. Reshma DIAZ/GIANCARLO      11/13/17 1601    Case Management/Social Work Plan    Plan Home- Denies any dc needs    Patient/Family In Agreement With Plan yes    Additional Comments CCP spoke with patient and son Hugo Velazquez ([phone]) at bedside, permission granted to speak in front. CCP verified  facesheet and pharmacy. Pt states that she has never seen Dr. Alex Bonner and that she would like CCP to find her new PCP that is a female. Pt prefers in the [facility] office, off of [address]. CCP will call and see if any they have any availablitiy. CCP also discussed [facility]ment Liasion and pt agreeable also. Pt states she lives independently in her apartment on the 2nd floor with 7 steps to enter. Pt denies any DME, HH or SNF. Pt states she is still driving, Pt denies any living will or POA, ccp provided pamplet and explained. Pt states her plan is home at dc, and denies any dc concerns. CCP explained Observation status and MOON notice given. Pts son denies any questions. CCP to continue to follow along. Reshma DIAZ/CCP        Discharge Placement     No information found                Demographic Summary       11/13/17 1606    Referral Information    Admission Type observation    Referral Source admission list    Reason For Consult discharge planning    Record Reviewed medical record    Contact Information    Permission Granted to Share Information With family/designee    Primary Care Physician Information    Name NO PCP            Functional Status     None            Psychosocial     None            Abuse/Neglect     None            Legal     None            Substance Abuse     None            Patient Forms       11/13/17 1605    Patient Forms    Patient Observation Letter Delivered    Delivered to Patient    Method of delivery In person          Veronica Freeman RN

## 2017-11-14 VITALS
HEIGHT: 66 IN | DIASTOLIC BLOOD PRESSURE: 81 MMHG | HEART RATE: 62 BPM | SYSTOLIC BLOOD PRESSURE: 147 MMHG | OXYGEN SATURATION: 94 % | RESPIRATION RATE: 16 BRPM | WEIGHT: 214.4 LBS | BODY MASS INDEX: 34.46 KG/M2 | TEMPERATURE: 97.6 F

## 2017-11-14 PROBLEM — D72.829 LEUKOCYTOSIS: Status: RESOLVED | Noted: 2017-11-12 | Resolved: 2017-11-14

## 2017-11-14 PROBLEM — I65.01: Status: ACTIVE | Noted: 2017-11-14

## 2017-11-14 PROBLEM — R55 SYNCOPE: Status: RESOLVED | Noted: 2017-11-12 | Resolved: 2017-11-14

## 2017-11-14 LAB
BACTERIA SPEC AEROBE CULT: NORMAL
BACTERIA SPEC AEROBE CULT: NORMAL

## 2017-11-14 PROCEDURE — G0378 HOSPITAL OBSERVATION PER HR: HCPCS

## 2017-11-14 PROCEDURE — 90661 CCIIV3 VAC ABX FR 0.5 ML IM: CPT | Performed by: HOSPITALIST

## 2017-11-14 PROCEDURE — 25010000002 INFLUENZA VAC SUBUNIT QUAD 0.5 ML SUSPENSION PREFILLED SYRINGE: Performed by: HOSPITALIST

## 2017-11-14 PROCEDURE — G0008 ADMIN INFLUENZA VIRUS VAC: HCPCS | Performed by: HOSPITALIST

## 2017-11-14 PROCEDURE — 96361 HYDRATE IV INFUSION ADD-ON: CPT

## 2017-11-14 PROCEDURE — 90732 PPSV23 VACC 2 YRS+ SUBQ/IM: CPT | Performed by: HOSPITALIST

## 2017-11-14 PROCEDURE — G0009 ADMIN PNEUMOCOCCAL VACCINE: HCPCS | Performed by: HOSPITALIST

## 2017-11-14 PROCEDURE — 25010000002 PNEUMOCOCCAL VAC POLYVALENT PER 0.5 ML: Performed by: HOSPITALIST

## 2017-11-14 PROCEDURE — 99213 OFFICE O/P EST LOW 20 MIN: CPT | Performed by: INTERNAL MEDICINE

## 2017-11-14 RX ORDER — LOSARTAN POTASSIUM 50 MG/1
50 TABLET ORAL DAILY
Qty: 30 TABLET | Refills: 0 | Status: SHIPPED | OUTPATIENT
Start: 2017-11-15

## 2017-11-14 RX ORDER — ATORVASTATIN CALCIUM 40 MG/1
40 TABLET, FILM COATED ORAL DAILY
Qty: 30 TABLET | Refills: 0 | Status: SHIPPED | OUTPATIENT
Start: 2017-11-14

## 2017-11-14 RX ORDER — LOSARTAN POTASSIUM 50 MG/1
50 TABLET ORAL DAILY
Status: DISCONTINUED | OUTPATIENT
Start: 2017-11-14 | End: 2017-11-14 | Stop reason: HOSPADM

## 2017-11-14 RX ADMIN — AMLODIPINE BESYLATE 5 MG: 5 TABLET ORAL at 09:41

## 2017-11-14 RX ADMIN — LOSARTAN POTASSIUM 50 MG: 50 TABLET, FILM COATED ORAL at 12:01

## 2017-11-14 RX ADMIN — SODIUM CHLORIDE 75 ML/HR: 9 INJECTION, SOLUTION INTRAVENOUS at 02:53

## 2017-11-14 RX ADMIN — PNEUMOCOCCAL VACCINE POLYVALENT 0.5 ML
25; 25; 25; 25; 25; 25; 25; 25; 25; 25; 25; 25; 25; 25; 25; 25; 25; 25; 25; 25; 25; 25; 25 INJECTION, SOLUTION INTRAMUSCULAR; SUBCUTANEOUS at 14:52

## 2017-11-14 RX ADMIN — ATORVASTATIN CALCIUM 20 MG: 20 TABLET, FILM COATED ORAL at 09:41

## 2017-11-14 RX ADMIN — ISOSORBIDE MONONITRATE 30 MG: 30 TABLET ORAL at 09:41

## 2017-11-14 RX ADMIN — A/SINGAPORE/GP1908/2015 IVR-180 (H1N1) (AN A/MICHIGAN/45/2015-LIKE VIRUS), A/SINGAPORE/GP2050/2015 (H3N2) (AN A/HONG KONG/4801/2014 - LIKE VIRUS), B/UTAH/9/2014 (A B/PHUKET/3073/2013-LIKE VIRUS), B/HONG KONG/259/2010 (A B/BRISBANE/60/08-LIKE VIRUS) 0.5 ML: 15; 15; 15; 15 INJECTION, SUSPENSION INTRAMUSCULAR at 14:53

## 2017-11-14 NOTE — PROGRESS NOTES
Continued Stay Note  [facility]     Patient Name: Malika Armstrong  MRN: [id number]  Today's Date: 11/14/2017    Admit Date: 11/12/2017          Discharge Plan       11/14/17 1603    Case Management/Social Work Plan    Plan hOME    Additional Comments CCP called [facility] appointment liasion # and left  requesting that they call patient at her home since she has already left for dc. Request call back. Amy DIAZ/CCP              Discharge Codes     None        Expected Discharge Date and Time     Expected Discharge Date Expected Discharge Time    Nov 14, 2017             Veronica Freeman, RN

## 2017-11-14 NOTE — PROGRESS NOTES
"Malika Armstrong  1945 72 y.o.  [id number]      Patient Care Team:  Alex Bonner MD as PCP - General (Family Medicine)  Yves Roach MD as PCP - Claims Attributed  Sathya Avalos MD as Consulting Physician (Hematology and Oncology)  Indiana Montelongo RN as Care Coordinator (Population Health)    CC: Vasovagal syncope    Interval History: She feels great is anxious to go home      Objective   Vital Signs  Temp:  [97.7 °F (36.5 °C)-98 °F (36.7 °C)] 97.7 °F (36.5 °C)  Heart Rate:  [57-68] 58  Resp:  [16-18] 16  BP: (148-198)/(77-97) 159/77    Intake/Output Summary (Last 24 hours) at 11/14/17 0959  Last data filed at 11/14/17 0253   Gross per 24 hour   Intake             1000 ml   Output                0 ml   Net             1000 ml     Flowsheet Rows         First Filed Value    Admission Height  66\" (167.6 cm) Documented at 11/12/2017 1550    Admission Weight  200 lb (90.7 kg) Documented at 11/12/2017 1550          Physical Exam:   General Appearance:    Alert,oriented, in no acute distress   Lungs:     Clear to auscultation,BS are equal    Heart:    Normal S1 and S2, RRR without murmur, gallop or rub   HEENT:    Sclera are clear, no JVD or adenopathy   Abdomen:     Normal bowel sounds, soft non-tender, non-distended, no HSM   Extremities:   Moves all extremities well, no edema, no cyanosis, no             Redness, no rash     Medication Review:        amLODIPine 5 mg Oral Q24H   atorvastatin 20 mg Oral Daily   isosorbide mononitrate 30 mg Oral Q24H   losartan 50 mg Oral Daily       sodium chloride 75 mL/hr Last Rate: 75 mL/hr (11/14/17 0616)         I reviewed the patient's new clinical results.  I personally viewed and interpreted the patient's EKG/Telemetry data    Assessment/Plan  Active Hospital Problems (** Indicates Principal Problem)    Diagnosis Date Noted   • **Syncope [R55] 11/12/2017   • Abnormal urinalysis [R82.90] 11/13/2017   • Prediabetes [R73.03] 11/13/2017   • Bradycardia [R00.1] 11/12/2017 "   • CKD (chronic kidney disease) stage 4, GFR 15-29 ml/min [N18.4] 11/12/2017   • Leukocytosis [D72.829] 11/12/2017   • D-dimer, elevated [R79.89] 11/12/2017   • Mediastinal shift [R93.8] 11/12/2017   • Medically noncompliant [Z91.19] 11/12/2017   • HTN (hypertension) [I10] 11/12/2017      Resolved Hospital Problems    Diagnosis Date Noted Date Resolved   No resolved problems to display.       She had vasovagal syncope I don't see any other issues other than her blood pressure is now too high and I'm going to put her on a little bit of losartan.  She can go home today and follow-up with me in 6 months    Ravindra Emmanuel MD  11/14/17  9:59 AM

## 2017-11-14 NOTE — PLAN OF CARE
Problem: Patient Care Overview (Adult)  Goal: Plan of Care Review  Outcome: Ongoing (interventions implemented as appropriate)    11/14/17 0350   Coping/Psychosocial Response Interventions   Plan Of Care Reviewed With patient   Patient Care Overview   Progress improving   Outcome Evaluation   Outcome Summary/Follow up Plan BP running high. IV fluids still going. No complaints of pain or dizziness. Patient slept through most of the night. Neruology said ok to d/c from their standpoint. Possible d/c soon to home. Will continue to monitor.        Goal: Adult Individualization and Mutuality  Outcome: Ongoing (interventions implemented as appropriate)  Goal: Discharge Needs Assessment  Outcome: Ongoing (interventions implemented as appropriate)    Problem: Fall Risk (Adult)  Goal: Absence of Falls  Outcome: Ongoing (interventions implemented as appropriate)    Problem: Cardiac Output, Decreased (Adult)  Goal: Adequate Cardiac Output/Effective Tissue Perfusion  Outcome: Ongoing (interventions implemented as appropriate)    Problem: Respiratory Insufficiency (Adult)  Goal: Acid/Base Balance  Outcome: Ongoing (interventions implemented as appropriate)  Goal: Effective Ventilation  Outcome: Ongoing (interventions implemented as appropriate)

## 2017-11-14 NOTE — DISCHARGE SUMMARY
Date of Admission: 11/12/2017  Date of Discharge:  11/14/2017  Primary Care Physician: Alex Bonner MD     Discharge Diagnosis:  Active Hospital Problems (** Indicates Principal Problem)    Diagnosis Date Noted   • Stenosis of right vertebral artery without cerebral infarction [I65.01] 11/14/2017   • Abnormal urinalysis [R82.90] 11/13/2017   • Prediabetes [R73.03] 11/13/2017   • Bradycardia [R00.1] 11/12/2017   • CKD (chronic kidney disease) stage 4, GFR 15-29 ml/min [N18.4] 11/12/2017   • D-dimer, elevated [R79.89] 11/12/2017   • Mediastinal shift [R93.8] 11/12/2017   • Medically noncompliant [Z91.19] 11/12/2017   • HTN (hypertension) [I10] 11/12/2017      Resolved Hospital Problems    Diagnosis Date Noted Date Resolved   • **Syncope [R55] 11/12/2017 11/14/2017   • Leukocytosis [D72.829] 11/12/2017 11/14/2017       Presenting Problem/History of Present Illness:  Bradycardia [R00.1]  Elevated d-dimer [R79.89]  Syncope, unspecified syncope type [R55]  Syncope, unspecified syncope type [R55]     Hospital Course:  The patient is a 72 y.o. female who presented with a syncopal episode.  Please see admission H&P from 11/12/17 for further details. On admission, she had a cardiac and neurologic workup with heart and brain imaging (see below).  She did have some incidental right vertebral artery stenosis.  Her symptoms resolved completely with IV hydration.  Dr. Emmanuel and Dr. Phoenix with cardiology and neurology respectively followed the patient while in the hospital.  This episode was thought to have been vasovagal in nature.  There was no additional workup recommended.  Of note, her statin dose was increased due ot findings on the brain MRI and losartan was added for better blood pressure control.  She is to follow up with Dr. Emmanuel in 6 months.  She should follow up with her PCP in about a week.     Exam Today:  Blood pressure 147/81, pulse 62, temperature 97.6 °F (36.4 °C), temperature source Oral, resp. rate 16,  "height 66\" (167.6 cm), weight 214 lb 6.4 oz (97.3 kg), SpO2 94 %.  Constitutional: She is oriented to person, place, and time. No distress.   Head: Normocephalic and atraumatic.   Mouth/Throat: Oropharynx is clear and moist.   Eyes: Conjunctivae and EOM are normal. Pupils are equal, round, and reactive to light.   Neck: Neck supple. No JVD present.   Cardiovascular: Normal rate, regular rhythm and intact distal pulses.    Pulmonary/Chest: Effort normal. Clear to ascultation  Abdominal: Soft. Bowel sounds are normal. There is no tenderness.   Musculoskeletal: She exhibits no edema or tenderness.   Neurological: She is alert and oriented to person, place, and time. No cranial nerve deficit. She exhibits normal muscle tone.   Skin: Skin is warm and dry. No rash noted. She is not diaphoretic.   Psychiatric: She has a normal mood and affect. Her behavior is normal.   Nursing note and vitals reviewed.    Procedures Performed:  Transthoracic Echocardiogram - 11/13/17  Left Ventricle  Left ventricular systolic function is normal. Calculated EF = 50.5%. Normal left ventricular cavity size noted. All left ventricular wall segments contract normally. Left ventricular wall thickness is consistent with mild concentric hypertrophy. Left ventricular diastolic function is normal.      Right Ventricle  Normal right ventricular cavity size noted.     Left Atrium  Normal left atrial size noted.     Right Atrium  The right atrium was not well visualized.     Aortic Valve  The aortic valve is not well visualized. The aortic valve is grossly normal in structure. No significant regurgitation aortic valve regurgitation is present. No hemodynamically significant aortic valve stenosis is present. Aortic valve dimensionless index is 0.7.     Mitral Valve  The mitral valve is not well visualized. The mitral valve is grossly normal in structure. No significant mitral valve regurgitation is present. No significant mitral valve stenosis is " present.     Tricuspid Valve  Tricuspid valve not well visualized. The tricuspid valve is grossly normal.     Pulmonic Valve  The pulmonic valve was not well visualized.     Greater Vessels  No dilation of the aortic root is present.     Pericardium  There is no evidence of pericardial effusion.     MRI Brain Without Contrast-17  -Abnormal flow void within the intracranial segment of the right  vertebral artery which is suggestive of a proximal severe stenosis or  complete occlusion. Further evaluation could be performed with a CT  angiogram if clinically indicated.  -Mild-to-moderate changes of chronic small vessel ischemic phenomena.  -Findings compatible with a punctate chronic microhemorrhage within  the inferior aspect of the right frontal lobe.    Malika Armstrong   Duplex scan of extracranial arteries using B-mode, color flow, and/or spectral Doppler; bilateral   Order# [id number]    Reading physician: Denny Magana Jr., MD   Ordering physician: Todd Gray MD   Study date: 17         Patient Information      Patient Name MRN Sex  (Age)     Malika Armstrong [id number] Female 1945 (72 y.o.)       Clinical Indication      other; syncope and collapse       Interpretation Summary      · Proximal right internal carotid artery mild stenosis.  · Proximal left internal carotid artery mild stenosis.           Study Findings      • Right CCA Prox: No plaque visualized.   • Right CCA Dist: Plaque present.   • Right ICA Prox: Plaque present.   • Right ECA: No plaque visualized.   • Right Vertebral: Antegrade flow noted.       • Left CCA Prox: No plaque visualized.   • Left CCA Dist: No plaque visualized.   • Left ICA Prox: Plaque present.   • Left ECA: No plaque visualized.   • Left Vertebral: Antegrade flow noted.              Consults:   Consults     Date and Time Order Name Status Description    2017 Inpatient Consult to Neurology Completed     2017 Inpatient  Consult to Cardiology Completed     11/12/2017 0437 [facility] (on-call MD unless specified)             Discharge Disposition:  Home or Self Care    Discharge Medications:   Malika Armstrong   Home Medication Instructions JODI:[id number]    Printed on:11/14/17 6402   Medication Information                      amLODIPine (NORVASC) 5 MG tablet  Take 1 tablet by mouth daily.             atorvastatin (LIPITOR) 40 MG tablet  Take 1 tablet by mouth Daily.             HYDROCODONE-ACETAMINOPHEN PO  Take  by mouth.             isosorbide mononitrate (IMDUR) 30 MG 24 hr tablet  Take 1 tablet by mouth daily.             losartan (COZAAR) 50 MG tablet  Take 1 tablet by mouth Daily.             nitrofurantoin (FURADANTIN) 25 MG/5ML suspension  Take  by mouth 4 (Four) Times a Day.             phenazopyridine (PYRIDIUM) 100 MG tablet  Take 100 mg by mouth 3 (Three) Times a Day As Needed for bladder spasms.                 Discharge Diet:   Diet Instructions     Diet: Consistent Carbohydrate, Cardiac; Thin       Discharge Diet:   Consistent Carbohydrate  Cardiac      Fluid Consistency:  Thin                 Activity at Discharge:   Activity Instructions     Activity as Tolerated                     Follow-up Appointments:  No future appointments.  Additional Instructions for the Follow-ups that You Need to Schedule     Discharge Follow-up with PCP    As directed    Follow Up Details:  1 week       Discharge Follow-up with Specified Provider: Dr. Emmanuel; 6 Months    As directed    To:  Dr. Emmanuel   Follow Up:  6 Months                 Test Results Pending at Discharge:       Kulwant Barr MD  11/14/17  2:07 PM    Time Spent on Discharge Activities: Less than 30 minutes.

## 2017-11-15 ENCOUNTER — EPISODE CHANGES (OUTPATIENT)
Dept: CASE MANAGEMENT | Facility: OTHER | Age: 72
End: 2017-11-15

## 2017-11-16 NOTE — PROGRESS NOTES
Case Management Discharge Note    Final Note: Home with son. VIANEYPete RN/CCP    Discharge Placement     No information found             Discharge Codes: 01  Discharge to home

## 2017-11-17 ENCOUNTER — EPISODE CHANGES (OUTPATIENT)
Dept: CASE MANAGEMENT | Facility: OTHER | Age: 72
End: 2017-11-17

## 2017-12-14 ENCOUNTER — TRANSCRIBE ORDERS (OUTPATIENT)
Dept: ADMINISTRATIVE | Facility: HOSPITAL | Age: 72
End: 2017-12-14

## 2017-12-14 ENCOUNTER — HOSPITAL ENCOUNTER (OUTPATIENT)
Dept: GENERAL RADIOLOGY | Facility: HOSPITAL | Age: 72
Discharge: HOME | End: 2017-12-14
Attending: UROLOGY | Admitting: UROLOGY

## 2017-12-14 DIAGNOSIS — N20.0 KIDNEY STONE: Primary | ICD-10-CM

## 2017-12-14 DIAGNOSIS — N20.0 KIDNEY STONE: ICD-10-CM

## 2017-12-14 PROCEDURE — 74000 HC ABDOMEN KUB: CPT

## 2018-02-20 ENCOUNTER — EPISODE CHANGES (OUTPATIENT)
Dept: CASE MANAGEMENT | Facility: OTHER | Age: 73
End: 2018-02-20